# Patient Record
Sex: MALE | Race: BLACK OR AFRICAN AMERICAN | NOT HISPANIC OR LATINO | URBAN - METROPOLITAN AREA
[De-identification: names, ages, dates, MRNs, and addresses within clinical notes are randomized per-mention and may not be internally consistent; named-entity substitution may affect disease eponyms.]

---

## 2018-08-15 ENCOUNTER — INPATIENT (INPATIENT)
Facility: HOSPITAL | Age: 73
LOS: 5 days | Discharge: HOME | DRG: 190 | End: 2018-08-21
Attending: INTERNAL MEDICINE | Admitting: HOSPITALIST
Payer: MEDICARE

## 2018-08-15 VITALS
SYSTOLIC BLOOD PRESSURE: 134 MMHG | HEART RATE: 123 BPM | TEMPERATURE: 100 F | WEIGHT: 139.99 LBS | HEIGHT: 70 IN | RESPIRATION RATE: 20 BRPM | DIASTOLIC BLOOD PRESSURE: 88 MMHG | OXYGEN SATURATION: 98 %

## 2018-08-15 DIAGNOSIS — Z98.890 OTHER SPECIFIED POSTPROCEDURAL STATES: Chronic | ICD-10-CM

## 2018-08-15 DIAGNOSIS — J44.1 CHRONIC OBSTRUCTIVE PULMONARY DISEASE WITH (ACUTE) EXACERBATION: ICD-10-CM

## 2018-08-15 LAB
ALBUMIN SERPL ELPH-MCNC: 4.5 G/DL — SIGNIFICANT CHANGE UP (ref 3.3–5.2)
ALP SERPL-CCNC: 86 U/L — SIGNIFICANT CHANGE UP (ref 40–120)
ALT FLD-CCNC: 22 U/L — SIGNIFICANT CHANGE UP
ANION GAP SERPL CALC-SCNC: 15 MMOL/L — SIGNIFICANT CHANGE UP (ref 5–17)
APTT BLD: 29.8 SEC — SIGNIFICANT CHANGE UP (ref 27.5–37.4)
AST SERPL-CCNC: 30 U/L — SIGNIFICANT CHANGE UP
BASOPHILS # BLD AUTO: 0 K/UL — SIGNIFICANT CHANGE UP (ref 0–0.2)
BASOPHILS NFR BLD AUTO: 0.1 % — SIGNIFICANT CHANGE UP (ref 0–2)
BILIRUB SERPL-MCNC: 1.3 MG/DL — SIGNIFICANT CHANGE UP (ref 0.4–2)
BUN SERPL-MCNC: 8 MG/DL — SIGNIFICANT CHANGE UP (ref 8–20)
CALCIUM SERPL-MCNC: 9.7 MG/DL — SIGNIFICANT CHANGE UP (ref 8.6–10.2)
CHLORIDE SERPL-SCNC: 100 MMOL/L — SIGNIFICANT CHANGE UP (ref 98–107)
CO2 SERPL-SCNC: 27 MMOL/L — SIGNIFICANT CHANGE UP (ref 22–29)
CREAT SERPL-MCNC: 0.84 MG/DL — SIGNIFICANT CHANGE UP (ref 0.5–1.3)
EOSINOPHIL # BLD AUTO: 0.1 K/UL — SIGNIFICANT CHANGE UP (ref 0–0.5)
EOSINOPHIL NFR BLD AUTO: 0.6 % — SIGNIFICANT CHANGE UP (ref 0–5)
GLUCOSE SERPL-MCNC: 116 MG/DL — HIGH (ref 70–115)
HCT VFR BLD CALC: 48.7 % — SIGNIFICANT CHANGE UP (ref 42–52)
HGB BLD-MCNC: 16.4 G/DL — SIGNIFICANT CHANGE UP (ref 14–18)
INR BLD: 1.04 RATIO — SIGNIFICANT CHANGE UP (ref 0.88–1.16)
LYMPHOCYTES # BLD AUTO: 1.2 K/UL — SIGNIFICANT CHANGE UP (ref 1–4.8)
LYMPHOCYTES # BLD AUTO: 7 % — LOW (ref 20–55)
MCHC RBC-ENTMCNC: 31.4 PG — HIGH (ref 27–31)
MCHC RBC-ENTMCNC: 33.7 G/DL — SIGNIFICANT CHANGE UP (ref 32–36)
MCV RBC AUTO: 93.3 FL — SIGNIFICANT CHANGE UP (ref 80–94)
MONOCYTES # BLD AUTO: 1.3 K/UL — HIGH (ref 0–0.8)
MONOCYTES NFR BLD AUTO: 7.9 % — SIGNIFICANT CHANGE UP (ref 3–10)
NEUTROPHILS # BLD AUTO: 14.4 K/UL — HIGH (ref 1.8–8)
NEUTROPHILS NFR BLD AUTO: 84.1 % — HIGH (ref 37–73)
NT-PROBNP SERPL-SCNC: 98 PG/ML — SIGNIFICANT CHANGE UP (ref 0–300)
PLATELET # BLD AUTO: 202 K/UL — SIGNIFICANT CHANGE UP (ref 150–400)
POTASSIUM SERPL-MCNC: 4.3 MMOL/L — SIGNIFICANT CHANGE UP (ref 3.5–5.3)
POTASSIUM SERPL-SCNC: 4.3 MMOL/L — SIGNIFICANT CHANGE UP (ref 3.5–5.3)
PROT SERPL-MCNC: 7.5 G/DL — SIGNIFICANT CHANGE UP (ref 6.6–8.7)
PROTHROM AB SERPL-ACNC: 11.5 SEC — SIGNIFICANT CHANGE UP (ref 9.8–12.7)
RBC # BLD: 5.22 M/UL — SIGNIFICANT CHANGE UP (ref 4.6–6.2)
RBC # FLD: 15.2 % — SIGNIFICANT CHANGE UP (ref 11–15.6)
SODIUM SERPL-SCNC: 142 MMOL/L — SIGNIFICANT CHANGE UP (ref 135–145)
TROPONIN T SERPL-MCNC: <0.01 NG/ML — SIGNIFICANT CHANGE UP (ref 0–0.06)
WBC # BLD: 17.1 K/UL — HIGH (ref 4.8–10.8)
WBC # FLD AUTO: 17.1 K/UL — HIGH (ref 4.8–10.8)

## 2018-08-15 PROCEDURE — 99291 CRITICAL CARE FIRST HOUR: CPT

## 2018-08-15 PROCEDURE — 99223 1ST HOSP IP/OBS HIGH 75: CPT

## 2018-08-15 PROCEDURE — 71046 X-RAY EXAM CHEST 2 VIEWS: CPT | Mod: 26

## 2018-08-15 PROCEDURE — 71250 CT THORAX DX C-: CPT | Mod: 26

## 2018-08-15 PROCEDURE — 93306 TTE W/DOPPLER COMPLETE: CPT | Mod: 26

## 2018-08-15 PROCEDURE — 93010 ELECTROCARDIOGRAM REPORT: CPT

## 2018-08-15 RX ORDER — ACETAMINOPHEN 500 MG
650 TABLET ORAL ONCE
Qty: 0 | Refills: 0 | Status: DISCONTINUED | OUTPATIENT
Start: 2018-08-15 | End: 2018-08-15

## 2018-08-15 RX ORDER — ENOXAPARIN SODIUM 100 MG/ML
40 INJECTION SUBCUTANEOUS DAILY
Qty: 0 | Refills: 0 | Status: DISCONTINUED | OUTPATIENT
Start: 2018-08-15 | End: 2018-08-21

## 2018-08-15 RX ORDER — SODIUM CHLORIDE 9 MG/ML
3 INJECTION INTRAMUSCULAR; INTRAVENOUS; SUBCUTANEOUS ONCE
Qty: 0 | Refills: 0 | Status: COMPLETED | OUTPATIENT
Start: 2018-08-15 | End: 2018-08-15

## 2018-08-15 RX ORDER — SODIUM CHLORIDE 9 MG/ML
1900 INJECTION, SOLUTION INTRAVENOUS ONCE
Qty: 0 | Refills: 0 | Status: COMPLETED | OUTPATIENT
Start: 2018-08-15 | End: 2018-08-15

## 2018-08-15 RX ORDER — ACETAMINOPHEN 500 MG
650 TABLET ORAL EVERY 6 HOURS
Qty: 0 | Refills: 0 | Status: DISCONTINUED | OUTPATIENT
Start: 2018-08-15 | End: 2018-08-21

## 2018-08-15 RX ORDER — IPRATROPIUM/ALBUTEROL SULFATE 18-103MCG
3 AEROSOL WITH ADAPTER (GRAM) INHALATION
Qty: 0 | Refills: 0 | Status: DISCONTINUED | OUTPATIENT
Start: 2018-08-15 | End: 2018-08-21

## 2018-08-15 RX ORDER — AZITHROMYCIN 500 MG/1
250 TABLET, FILM COATED ORAL DAILY
Qty: 0 | Refills: 0 | Status: DISCONTINUED | OUTPATIENT
Start: 2018-08-16 | End: 2018-08-19

## 2018-08-15 RX ORDER — AZITHROMYCIN 500 MG/1
500 TABLET, FILM COATED ORAL ONCE
Qty: 0 | Refills: 0 | Status: COMPLETED | OUTPATIENT
Start: 2018-08-15 | End: 2018-08-15

## 2018-08-15 RX ORDER — CEFTRIAXONE 500 MG/1
1 INJECTION, POWDER, FOR SOLUTION INTRAMUSCULAR; INTRAVENOUS ONCE
Qty: 0 | Refills: 0 | Status: COMPLETED | OUTPATIENT
Start: 2018-08-15 | End: 2018-08-15

## 2018-08-15 RX ORDER — IPRATROPIUM/ALBUTEROL SULFATE 18-103MCG
3 AEROSOL WITH ADAPTER (GRAM) INHALATION EVERY 6 HOURS
Qty: 0 | Refills: 0 | Status: DISCONTINUED | OUTPATIENT
Start: 2018-08-15 | End: 2018-08-21

## 2018-08-15 RX ORDER — IPRATROPIUM/ALBUTEROL SULFATE 18-103MCG
3 AEROSOL WITH ADAPTER (GRAM) INHALATION ONCE
Qty: 0 | Refills: 0 | Status: COMPLETED | OUTPATIENT
Start: 2018-08-15 | End: 2018-08-15

## 2018-08-15 RX ORDER — HYDRALAZINE HCL 50 MG
10 TABLET ORAL EVERY 6 HOURS
Qty: 0 | Refills: 0 | Status: DISCONTINUED | OUTPATIENT
Start: 2018-08-15 | End: 2018-08-18

## 2018-08-15 RX ORDER — PANTOPRAZOLE SODIUM 20 MG/1
40 TABLET, DELAYED RELEASE ORAL
Qty: 0 | Refills: 0 | Status: DISCONTINUED | OUTPATIENT
Start: 2018-08-15 | End: 2018-08-21

## 2018-08-15 RX ORDER — SACCHAROMYCES BOULARDII 250 MG
250 POWDER IN PACKET (EA) ORAL
Qty: 0 | Refills: 0 | Status: DISCONTINUED | OUTPATIENT
Start: 2018-08-15 | End: 2018-08-20

## 2018-08-15 RX ADMIN — SODIUM CHLORIDE 3 MILLILITER(S): 9 INJECTION INTRAMUSCULAR; INTRAVENOUS; SUBCUTANEOUS at 16:06

## 2018-08-15 RX ADMIN — SODIUM CHLORIDE 1900 MILLILITER(S): 9 INJECTION, SOLUTION INTRAVENOUS at 12:00

## 2018-08-15 RX ADMIN — ENOXAPARIN SODIUM 40 MILLIGRAM(S): 100 INJECTION SUBCUTANEOUS at 22:09

## 2018-08-15 RX ADMIN — Medication 3 MILLILITER(S): at 20:37

## 2018-08-15 RX ADMIN — Medication 3 MILLILITER(S): at 15:26

## 2018-08-15 RX ADMIN — SODIUM CHLORIDE 1900 MILLILITER(S): 9 INJECTION, SOLUTION INTRAVENOUS at 10:41

## 2018-08-15 RX ADMIN — Medication 125 MILLIGRAM(S): at 10:49

## 2018-08-15 RX ADMIN — Medication 40 MILLIGRAM(S): at 22:10

## 2018-08-15 RX ADMIN — CEFTRIAXONE 100 GRAM(S): 500 INJECTION, POWDER, FOR SOLUTION INTRAMUSCULAR; INTRAVENOUS at 10:39

## 2018-08-15 RX ADMIN — AZITHROMYCIN 500 MILLIGRAM(S): 500 TABLET, FILM COATED ORAL at 10:40

## 2018-08-15 RX ADMIN — CEFTRIAXONE 1 GRAM(S): 500 INJECTION, POWDER, FOR SOLUTION INTRAMUSCULAR; INTRAVENOUS at 10:50

## 2018-08-15 RX ADMIN — Medication 3 MILLILITER(S): at 10:49

## 2018-08-15 NOTE — ED ADULT NURSE NOTE - OBJECTIVE STATEMENT
73 year old male comes to ED complaining of difficulty breathing. patient states his breathing worsened last night, unable to sleep. patient states it worsens when he is up walking around. no swelling noted to LE. patient complaining of productive cough with clear, dark phlegm denies blood. patient states he felt hot at home. 73 year old male comes to ED complaining of difficulty breathing. patient states his breathing worsened last night, unable to sleep. patient has hx. of COPD, no home 02. patient states it worsens when he is up walking around. no swelling noted to LE. patient complaining of productive cough with clear, dark phlegm denies blood. patient states he felt hot at home.

## 2018-08-15 NOTE — H&P ADULT - NSHPLABSRESULTS_GEN_ALL_CORE
LABS:                        16.4   17.1  )-----------( 202      ( 15 Aug 2018 10:25 )             48.7     08-15    142  |  100  |  8.0  ----------------------------<  116<H>  4.3   |  27.0  |  0.84    Ca    9.7      15 Aug 2018 10:25    TPro  7.5  /  Alb  4.5  /  TBili  1.3  /  DBili  x   /  AST  30  /  ALT  22  /  AlkPhos  86  08-15    PT/INR - ( 15 Aug 2018 10:25 )   PT: 11.5 sec;   INR: 1.04 ratio         PTT - ( 15 Aug 2018 10:25 )  PTT:29.8 sec    LIVER FUNCTIONS - ( 15 Aug 2018 10:25 )  Alb: 4.5 g/dL / Pro: 7.5 g/dL / ALK PHOS: 86 U/L / ALT: 22 U/L / AST: 30 U/L / GGT: x

## 2018-08-15 NOTE — ED PROVIDER NOTE - OBJECTIVE STATEMENT
Patient with PMH COPD presents complaining of SOB which started last night, worse with exertion prompting him to call 911. He states that his COPD worsened since he quit smoking in 2010. He denies any fevers, chills, sore throat, chest pain or abdominal pain. He notes that he frequently coughs up sputum which is either clear or darkish in color, but denies blood in the sputum or hemoptysis. He also denies nausea, vomiting, diarrhea or constipation. He takes no blood thinners. He denies orthopnea.    PMD: Dr. Brewer

## 2018-08-15 NOTE — H&P ADULT - HISTORY OF PRESENT ILLNESS
History obtained from Pt and her daughter Ya Edmonds over the phone. Pt is 74 y/o male with PMHx of COPD not home O2, HTH, ex-smoker, presented to ER with worsening SOB. Pt reports he is always short of breath for many years, over he last few months its more progressive, with reduced exercise tolerance due to SOB associated with productive cough with clear mucus. He denied any fever, chills, nausea, vomiting, sore throat, headache, dizziness, bowel /bladder habits are normal.

## 2018-08-15 NOTE — ED PROVIDER NOTE - NS ED ROS FT
Const: Denies fever, chills  HEENT: Denies blurry vision, sore throat  Neck: Denies neck pain/stiffness  Resp: + coughing, + SOB  Cardiovascular: Denies CP, palpitations, LE edema  GI: Denies nausea, vomiting, abdominal pain, diarrhea, constipation, blood in stool  : Denies urinary frequency/urgency/dysuria, hematuria  MSK: Denies back pain  Neuro: Denies HA, dizziness, numbness, weakness  Skin: Denies rashes.

## 2018-08-15 NOTE — ED ADULT NURSE NOTE - NSIMPLEMENTINTERV_GEN_ALL_ED
Implemented All Universal Safety Interventions:  [place] to call system. Call bell, personal items and telephone within reach. Instruct patient to call for assistance. Room bathroom lighting operational. Non-slip footwear when patient is off stretcher. Physically safe environment: no spills, clutter or unnecessary equipment. Stretcher in lowest position, wheels locked, appropriate side rails in place.

## 2018-08-15 NOTE — ED PROVIDER NOTE - PHYSICAL EXAMINATION
Const: Awake, alert and oriented. In no acute distress. Thin, but well appearing.  HEENT: NC/AT. Dry mucous membranes.  Eyes: No scleral icterus. EOMI.  Neck:. Soft and supple. Full ROM without pain.  Cardiac: Tachycardic rate and regular rhythm. +S1/S2. No murmurs. Peripheral pulses 2+ and symmetric. No LE edema.  Resp: Speaking in full sentences on 2L NC. No evidence of respiratory distress. Diminished breath sounds diffusely. No wheezes, rales or rhonchi.  Abd: Soft, non-tender, non-distended. Normal bowel sounds in all 4 quadrants. No guarding or rebound.  Back: Spine midline and non-tender. No CVAT.  Skin: No rashes, abrasions or lacerations.  Lymph: No cervical lymphadenopathy.  Neuro: Awake, alert & oriented x 3. Moves all extremities symmetrically.

## 2018-08-15 NOTE — ED PROVIDER NOTE - MEDICAL DECISION MAKING DETAILS
Patient with PMH COPD not on home O2 presents for SOB and coughing since last night requiring 2L NC at this time with SOB worse with exertion. Will continue 2L NC, check for infectious vs. cardiac etiology and treat for presumed COPD exacerbation and reassess.

## 2018-08-15 NOTE — H&P ADULT - NSHPPHYSICALEXAM_GEN_ALL_CORE
PHYSICAL EXAM:    Vital Signs Last 24 Hrs  T(C): 37.7 (15 Aug 2018 10:01), Max: 37.7 (15 Aug 2018 10:01)  T(F): 99.8 (15 Aug 2018 10:01), Max: 99.8 (15 Aug 2018 10:01)  HR: 123 (15 Aug 2018 10:01) (123 - 123)  BP: 134/88 (15 Aug 2018 10:01) (134/88 - 134/88)  BP(mean): --  RR: 20 (15 Aug 2018 10:01) (20 - 20)  SpO2: 98% (15 Aug 2018 10:01) (98% - 98%)    GENERAL: Pt lying comfortably, NAD.  ENMT: PERRL, +EOMI.  NECK: soft, Supple, No JVD,   CHEST/LUNG: Diminished breath sound b/l, no wheezing or crackles.   HEART: S1S2+, Regular rate and rhythm; No murmurs.  ABDOMEN: Soft, Nontender, Nondistended; Bowel sounds present.  MUSCULOSKELETAL: Normal range of motion.  SKIN: No rashes or lesions.  NEURO: AAOX3, no focal deficits, no motor r sensory loss.  PSYCH: normal mood.

## 2018-08-16 LAB
CHOLEST SERPL-MCNC: 167 MG/DL — SIGNIFICANT CHANGE UP (ref 110–199)
HBA1C BLD-MCNC: 5.2 % — SIGNIFICANT CHANGE UP (ref 4–5.6)
HCT VFR BLD CALC: 43.4 % — SIGNIFICANT CHANGE UP (ref 42–52)
HDLC SERPL-MCNC: 85 MG/DL — SIGNIFICANT CHANGE UP
HGB BLD-MCNC: 14.4 G/DL — SIGNIFICANT CHANGE UP (ref 14–18)
LIPID PNL WITH DIRECT LDL SERPL: 68 MG/DL — SIGNIFICANT CHANGE UP
MCHC RBC-ENTMCNC: 31 PG — SIGNIFICANT CHANGE UP (ref 27–31)
MCHC RBC-ENTMCNC: 33.2 G/DL — SIGNIFICANT CHANGE UP (ref 32–36)
MCV RBC AUTO: 93.3 FL — SIGNIFICANT CHANGE UP (ref 80–94)
PLATELET # BLD AUTO: 185 K/UL — SIGNIFICANT CHANGE UP (ref 150–400)
RBC # BLD: 4.65 M/UL — SIGNIFICANT CHANGE UP (ref 4.6–6.2)
RBC # FLD: 14.9 % — SIGNIFICANT CHANGE UP (ref 11–15.6)
TOTAL CHOLESTEROL/HDL RATIO MEASUREMENT: 2 RATIO — LOW (ref 3.4–9.6)
TRIGL SERPL-MCNC: 70 MG/DL — SIGNIFICANT CHANGE UP (ref 10–200)
WBC # BLD: 11.7 K/UL — HIGH (ref 4.8–10.8)
WBC # FLD AUTO: 11.7 K/UL — HIGH (ref 4.8–10.8)

## 2018-08-16 PROCEDURE — 99222 1ST HOSP IP/OBS MODERATE 55: CPT

## 2018-08-16 PROCEDURE — 99233 SBSQ HOSP IP/OBS HIGH 50: CPT

## 2018-08-16 RX ADMIN — Medication 40 MILLIGRAM(S): at 21:44

## 2018-08-16 RX ADMIN — Medication 40 MILLIGRAM(S): at 13:44

## 2018-08-16 RX ADMIN — Medication 40 MILLIGRAM(S): at 05:47

## 2018-08-16 RX ADMIN — Medication 250 MILLIGRAM(S): at 05:47

## 2018-08-16 RX ADMIN — Medication 3 MILLILITER(S): at 14:16

## 2018-08-16 RX ADMIN — Medication 3 MILLILITER(S): at 05:05

## 2018-08-16 RX ADMIN — Medication 250 MILLIGRAM(S): at 18:17

## 2018-08-16 RX ADMIN — Medication 3 MILLILITER(S): at 20:24

## 2018-08-16 RX ADMIN — Medication 3 MILLILITER(S): at 09:43

## 2018-08-16 RX ADMIN — ENOXAPARIN SODIUM 40 MILLIGRAM(S): 100 INJECTION SUBCUTANEOUS at 13:44

## 2018-08-16 RX ADMIN — AZITHROMYCIN 250 MILLIGRAM(S): 500 TABLET, FILM COATED ORAL at 13:44

## 2018-08-16 RX ADMIN — PANTOPRAZOLE SODIUM 40 MILLIGRAM(S): 20 TABLET, DELAYED RELEASE ORAL at 05:47

## 2018-08-16 NOTE — PROGRESS NOTE ADULT - SUBJECTIVE AND OBJECTIVE BOX
seen for shortness of breath    no acute complaints  feels better,  improving sob/cough  no chest pain  ros otherwise negative     MEDICATIONS  (STANDING):  ALBUTerol/ipratropium for Nebulization 3 milliLiter(s) Nebulizer every 6 hours  azithromycin   Tablet 250 milliGRAM(s) Oral daily  enoxaparin Injectable 40 milliGRAM(s) SubCutaneous daily  methylPREDNISolone sodium succinate Injectable 40 milliGRAM(s) IV Push every 8 hours  pantoprazole    Tablet 40 milliGRAM(s) Oral before breakfast  saccharomyces boulardii 250 milliGRAM(s) Oral two times a day    MEDICATIONS  (PRN):  acetaminophen   Tablet 650 milliGRAM(s) Oral every 6 hours PRN For Temp greater than 38 C (100.4 F)  acetaminophen   Tablet. 650 milliGRAM(s) Oral every 6 hours PRN Mild Pain (1 - 3)  ALBUTerol/ipratropium for Nebulization 3 milliLiter(s) Nebulizer every 3 hours PRN Shortness of Breath  hydrALAZINE Injectable 10 milliGRAM(s) IV Push every 6 hours PRN SBP>170      Allergies    No Known Allergies      Vital Signs Last 24 Hrs  T(C): 36.6 (16 Aug 2018 07:54), Max: 36.9 (15 Aug 2018 20:24)  T(F): 97.8 (16 Aug 2018 07:54), Max: 98.4 (15 Aug 2018 20:24)  HR: 70 (16 Aug 2018 09:45) (70 - 90)  BP: 122/76 (16 Aug 2018 07:54) (120/76 - 166/93)  BP(mean): --  RR: 18 (16 Aug 2018 07:54) (18 - 20)  SpO2: 97% (16 Aug 2018 09:45) (96% - 100%)    PHYSICAL EXAM:    GENERAL: NAD frail/cachectic  CHEST/LUNG: diminished breath sounds diffusely  HEART: Regular rate and rhythm; S1 S2  ABDOMEN: Soft, Nontender, Nondistended; Bowel sounds present  EXTREMITIES:  no edema  NERVOUS SYSTEM:  Alert & Oriented X3, nonfocal    LABS:                        14.4   11.7  )-----------( 185      ( 16 Aug 2018 06:59 )             43.4     08-15    142  |  100  |  8.0  ----------------------------<  116<H>  4.3   |  27.0  |  0.84    Ca    9.7      15 Aug 2018 10:25    TPro  7.5  /  Alb  4.5  /  TBili  1.3  /  DBili  x   /  AST  30  /  ALT  22  /  AlkPhos  86  08-15    PT/INR - ( 15 Aug 2018 10:25 )   PT: 11.5 sec;   INR: 1.04 ratio         PTT - ( 15 Aug 2018 10:25 )  PTT:29.8 sec      CAPILLARY BLOOD GLUCOSE            RADIOLOGY & ADDITIONAL TESTS:

## 2018-08-16 NOTE — CONSULT NOTE ADULT - ASSESSMENT
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

## 2018-08-16 NOTE — PHYSICAL THERAPY INITIAL EVALUATION ADULT - ADDITIONAL COMMENTS
pt lives in a private home with his spouse with 4 steps to enter with no rails, no stairs inside. Pt has no DME.

## 2018-08-16 NOTE — CONSULT NOTE ADULT - SUBJECTIVE AND OBJECTIVE BOX
PULMONARY CONSULT NOTE      GUERLINE ANDRADEMARIFER-[id number]    Patient is a 73y old  Male who presents with a chief complaint of SOB (15 Aug 2018 14:26)      HISTORY OF PRESENT ILLNESS:    MEDICATIONS  (STANDING):  ALBUTerol/ipratropium for Nebulization 3 milliLiter(s) Nebulizer every 6 hours  azithromycin   Tablet 250 milliGRAM(s) Oral daily  enoxaparin Injectable 40 milliGRAM(s) SubCutaneous daily  methylPREDNISolone sodium succinate Injectable 40 milliGRAM(s) IV Push every 8 hours  pantoprazole    Tablet 40 milliGRAM(s) Oral before breakfast  saccharomyces boulardii 250 milliGRAM(s) Oral two times a day      MEDICATIONS  (PRN):  acetaminophen   Tablet 650 milliGRAM(s) Oral every 6 hours PRN For Temp greater than 38 C (100.4 F)  acetaminophen   Tablet. 650 milliGRAM(s) Oral every 6 hours PRN Mild Pain (1 - 3)  ALBUTerol/ipratropium for Nebulization 3 milliLiter(s) Nebulizer every 3 hours PRN Shortness of Breath  hydrALAZINE Injectable 10 milliGRAM(s) IV Push every 6 hours PRN SBP>170      Allergies    No Known Allergies    Intolerances        PAST MEDICAL & SURGICAL HISTORY:  Essential hypertension  Chronic obstructive pulmonary disease, unspecified COPD type  H/O hernia repair      FAMILY HISTORY:  No pertinent family history in first degree relatives      SOCIAL HISTORY  Smoking History:     REVIEW OF SYSTEMS:    CONSTITUTIONAL:  No fevers, chills, sweats    HEENT:  Eyes:  No diplopia or blurred vision. ENT:  No earache, sore throat or runny nose. sinus headache or postnasl drip    CARDIOVASCULAR:  No pressure, squeezing, tightness, or heaviness about the chest; no palpitations, leg swelling, orthopnea or PND    RESPIRATORY:  No cough, shortness of breath, PND or orthopnea. Mild SOBOE    GASTROINTESTINAL:  No abdominal pain, nausea, vomiting or diarrhea.    GENITOURINARY:  No dysuria, frequency or urgency.    NEUROLOGIC:  No paresthesias, fasciculations, seizures or weakness.    PSYCHIATRIC:  No disorder of thought or mood.    Vital Signs Last 24 Hrs  T(C): 36.6 (16 Aug 2018 07:54), Max: 36.9 (15 Aug 2018 20:24)  T(F): 97.8 (16 Aug 2018 07:54), Max: 98.4 (15 Aug 2018 20:24)  HR: 70 (16 Aug 2018 09:45) (70 - 90)  BP: 122/76 (16 Aug 2018 07:54) (120/76 - 166/93)  BP(mean): --  RR: 18 (16 Aug 2018 07:54) (18 - 20)  SpO2: 97% (16 Aug 2018 09:45) (96% - 100%)    PHYSICAL EXAMINATION:    GENERAL: The patient is a well-developed, well-nourished _____in no apparent distress.     HEENT: Head is normocephalic and atraumatic. Extraocular muscles are intact. Mucous membranes are moist.     NECK: Supple.     LUNGS: Clear to auscultation without wheezing, rales, or rhonchi. Respirations unlabored    HEART: Regular rate and rhythm without murmur.    ABDOMEN: Soft, nontender, and nondistended.  No hepatosplenomegaly is noted.    EXTREMITIES: Without any cyanosis, clubbing, rash, lesions or edema.    NEUROLOGIC: Grossly intact.      LABS:                        14.4   11.7  )-----------( 185      ( 16 Aug 2018 06:59 )             43.4     08-15    142  |  100  |  8.0  ----------------------------<  116<H>  4.3   |  27.0  |  0.84    Ca    9.7      15 Aug 2018 10:25    TPro  7.5  /  Alb  4.5  /  TBili  1.3  /  DBili  x   /  AST  30  /  ALT  22  /  AlkPhos  86  08-15    PT/INR - ( 15 Aug 2018 10:25 )   PT: 11.5 sec;   INR: 1.04 ratio         PTT - ( 15 Aug 2018 10:25 )  PTT:29.8 sec      CARDIAC MARKERS ( 15 Aug 2018 15:42 )  x     / <0.01 ng/mL / x     / x     / x            Serum Pro-Brain Natriuretic Peptide: 98 pg/mL (08-15-18 @ 13:15)          MICROBIOLOGY:    RADIOLOGY & ADDITIONAL STUDIES:  < from: CT Chest No Cont (08.15.18 @ 15:24) >     EXAM:  CT CHEST                          PROCEDURE DATE:  08/15/2018          INTERPRETATION:  HISTORY: Shortness of breath.    Date and Time of Exam: 8/15/2018 2:59 PM    TECHNIQUE:  Sections were obtained from the apices to the diaphragm   without intravenous contrast.    COMPARISON EXAMINATION:   No prior exam.    FINDINGS: No evidence of mediastinal or hilar lymphadenopathy. No   evidence of cardiomegaly. No evidence of pleural or pericardial effusion.    Coronary artery calcifications arenoted.    Bilateral emphysematous changes are noted. There is a strand of   atelectasis or fibrosis at the right lung base. The right apex there is a   small nodular density measuring less than 1 cm. The appearance suggests a   region of fibrosis or inflammation although neoplasm cannot be excluded.   The left lung is clear.    No significant osseous abnormality.      IMPRESSION:     Emphysematous changes.    Inflammatory or fibrotic focus in the right apex versus neoplastic   nodule. A follow-upCT scan of the chest is recommended in 3 months for   further evaluation of this finding..                 CHAUNCEY ELIAS M.D., ATTENDING RADIOLOGIST  This document has been electronically signed. Aug 15 2018  3:53PM    < end of copied text >  All films reviewed on PACS PULMONARY CONSULT NOTE      GUERLINE ANDRADEMARIFER-[id number]    Patient is a 73y old  Male who presents with a chief complaint of SOB (15 Aug 2018 14:26)      HISTORY OF PRESENT ILLNESS:  72 yo male with Hx COPD, >40pkyrs SC'ed 10 yrs ago,seen for increased painless SOB with cough and wheeze unresponsive to home Advair.  Marked reduction exercise tolerance. No edema, pnd or orthopnea.  .  MEDICATIONS  (STANDING):  ALBUTerol/ipratropium for Nebulization 3 milliLiter(s) Nebulizer every 6 hours  azithromycin   Tablet 250 milliGRAM(s) Oral daily  enoxaparin Injectable 40 milliGRAM(s) SubCutaneous daily  methylPREDNISolone sodium succinate Injectable 40 milliGRAM(s) IV Push every 8 hours  pantoprazole    Tablet 40 milliGRAM(s) Oral before breakfast  saccharomyces boulardii 250 milliGRAM(s) Oral two times a day      MEDICATIONS  (PRN):  acetaminophen   Tablet 650 milliGRAM(s) Oral every 6 hours PRN For Temp greater than 38 C (100.4 F)  acetaminophen   Tablet. 650 milliGRAM(s) Oral every 6 hours PRN Mild Pain (1 - 3)  ALBUTerol/ipratropium for Nebulization 3 milliLiter(s) Nebulizer every 3 hours PRN Shortness of Breath  hydrALAZINE Injectable 10 milliGRAM(s) IV Push every 6 hours PRN SBP>170      Allergies    No Known Allergies    Intolerances        PAST MEDICAL & SURGICAL HISTORY:  Essential hypertension  Chronic obstructive pulmonary disease, unspecified COPD type  H/O hernia repair      FAMILY HISTORY:  No pertinent family history in first degree relatives      SOCIAL HISTORY  Smoking History:     REVIEW OF SYSTEMS:    CONSTITUTIONAL:  No fevers, chills, sweats    HEENT:  Eyes:  No diplopia or blurred vision. ENT:  No earache, sore throat or runny nose. sinus headache or postnasl drip    CARDIOVASCULAR:  No pressure, squeezing, tightness, or heaviness about the chest; no palpitations, leg swelling, orthopnea or PND    RESPIRATORY:  above    GASTROINTESTINAL:  No abdominal pain, nausea, vomiting or diarrhea.    GENITOURINARY:  No dysuria, frequency or urgency.    NEUROLOGIC:  No paresthesias, fasciculations, seizures or weakness.    PSYCHIATRIC:  No disorder of thought or mood.    Vital Signs Last 24 Hrs  T(C): 36.6 (16 Aug 2018 07:54), Max: 36.9 (15 Aug 2018 20:24)  T(F): 97.8 (16 Aug 2018 07:54), Max: 98.4 (15 Aug 2018 20:24)  HR: 70 (16 Aug 2018 09:45) (70 - 90)  BP: 122/76 (16 Aug 2018 07:54) (120/76 - 166/93)  BP(mean): --  RR: 18 (16 Aug 2018 07:54) (18 - 20)  SpO2: 97% (16 Aug 2018 09:45) (96% - 100%)    PHYSICAL EXAMINATION:    GENERAL: The patient is a well-developed, well-nourished male in no apparent distress.     HEENT: Head is normocephalic and atraumatic. Extraocular muscles are intact. Mucous membranes are moist.     NECK: Supple.     LUNGS: marked diminished familia BS with inc I/E ratio, no rales, or rhonchi. Respirations unlabored    HEART: Regular rate and rhythm without murmur.    ABDOMEN: Soft, nontender, and nondistended.  No hepatosplenomegaly is noted.    EXTREMITIES: Without any cyanosis, clubbing, rash, lesions or edema.    NEUROLOGIC: Grossly intact.      LABS:                        14.4   11.7  )-----------( 185      ( 16 Aug 2018 06:59 )             43.4     08-15    142  |  100  |  8.0  ----------------------------<  116<H>  4.3   |  27.0  |  0.84    Ca    9.7      15 Aug 2018 10:25    TPro  7.5  /  Alb  4.5  /  TBili  1.3  /  DBili  x   /  AST  30  /  ALT  22  /  AlkPhos  86  08-15    PT/INR - ( 15 Aug 2018 10:25 )   PT: 11.5 sec;   INR: 1.04 ratio         PTT - ( 15 Aug 2018 10:25 )  PTT:29.8 sec      CARDIAC MARKERS ( 15 Aug 2018 15:42 )  x     / <0.01 ng/mL / x     / x     / x            Serum Pro-Brain Natriuretic Peptide: 98 pg/mL (08-15-18 @ 13:15)          MICROBIOLOGY:    RADIOLOGY & ADDITIONAL STUDIES:  < from: CT Chest No Cont (08.15.18 @ 15:24) >     EXAM:  CT CHEST                          PROCEDURE DATE:  08/15/2018          INTERPRETATION:  HISTORY: Shortness of breath.    Date and Time of Exam: 8/15/2018 2:59 PM    TECHNIQUE:  Sections were obtained from the apices to the diaphragm   without intravenous contrast.    COMPARISON EXAMINATION:   No prior exam.    FINDINGS: No evidence of mediastinal or hilar lymphadenopathy. No   evidence of cardiomegaly. No evidence of pleural or pericardial effusion.    Coronary artery calcifications arenoted.    Bilateral emphysematous changes are noted. There is a strand of   atelectasis or fibrosis at the right lung base. The right apex there is a   small nodular density measuring less than 1 cm. The appearance suggests a   region of fibrosis or inflammation although neoplasm cannot be excluded.   The left lung is clear.    No significant osseous abnormality.      IMPRESSION:     Emphysematous changes.    Inflammatory or fibrotic focus in the right apex versus neoplastic   nodule. A follow-upCT scan of the chest is recommended in 3 months for   further evaluation of this finding..                 CHAUNCEY ELIAS M.D., ATTENDING RADIOLOGIST  This document has been electronically signed. Aug 15 2018  3:53PM    < end of copied text >  All films reviewed on PACS

## 2018-08-16 NOTE — PHYSICAL THERAPY INITIAL EVALUATION ADULT - PERTINENT HX OF CURRENT PROBLEM, REHAB EVAL
pt is a 74 y/o male who presented to ER with worsening SOB, cough and limited activity due to SOB, pt with PMHx of COPD

## 2018-08-16 NOTE — PROGRESS NOTE ADULT - ASSESSMENT
P73 y/o male with PMHx of COPD not home O2, HTN, ex-smoker, presented to ER with worsening SOB, cough and limited activity due to SOB, In ER noted to have high WBC, CXR clean. Admitted to medicine for worsening SOB likely from COPD.     COPD exacerbation:  - Requiring O2 in ER.  - Will keep on IV steroids, Duoneb, Zithromax.   - Supplements O2, PPI while on steroids.  - off note- Pt does not follow any pulmonology, only on Advair at home.  - pulmonary consulted to ensure outpatient follow up    Leukocytosis:  - no sign of infection, afebrile, lactate normal, CXR with no infection.   - Zithromax for COPD as above.  - Monitor WBCs.    >H/o HTN- BP stable here. On Lotrel 10/20 at home, per pharmacy last picked up in Feb/18, ? non compliance. Keep on hydralazine prn for now, If BP elevated consider resuming home meds.   >DVT ppx- Lovenox.

## 2018-08-17 DIAGNOSIS — Z87.891 PERSONAL HISTORY OF NICOTINE DEPENDENCE: ICD-10-CM

## 2018-08-17 DIAGNOSIS — Z86.79 PERSONAL HISTORY OF OTHER DISEASES OF THE CIRCULATORY SYSTEM: ICD-10-CM

## 2018-08-17 PROBLEM — I10 ESSENTIAL (PRIMARY) HYPERTENSION: Chronic | Status: ACTIVE | Noted: 2018-08-15

## 2018-08-17 PROBLEM — J44.9 CHRONIC OBSTRUCTIVE PULMONARY DISEASE, UNSPECIFIED: Chronic | Status: ACTIVE | Noted: 2018-08-15

## 2018-08-17 PROCEDURE — 99233 SBSQ HOSP IP/OBS HIGH 50: CPT

## 2018-08-17 PROCEDURE — 99232 SBSQ HOSP IP/OBS MODERATE 35: CPT

## 2018-08-17 RX ORDER — AMLODIPINE BESYLATE AND BENAZEPRIL HYDROCHLORIDE 10; 20 MG/1; MG/1
1 CAPSULE ORAL
Qty: 0 | Refills: 0 | COMMUNITY

## 2018-08-17 RX ORDER — METOPROLOL TARTRATE 50 MG
25 TABLET ORAL
Qty: 0 | Refills: 0 | Status: DISCONTINUED | OUTPATIENT
Start: 2018-08-17 | End: 2018-08-21

## 2018-08-17 RX ORDER — SODIUM CHLORIDE 9 MG/ML
1000 INJECTION INTRAMUSCULAR; INTRAVENOUS; SUBCUTANEOUS
Qty: 0 | Refills: 0 | Status: COMPLETED | OUTPATIENT
Start: 2018-08-17 | End: 2018-08-18

## 2018-08-17 RX ADMIN — Medication 250 MILLIGRAM(S): at 18:03

## 2018-08-17 RX ADMIN — Medication 250 MILLIGRAM(S): at 05:54

## 2018-08-17 RX ADMIN — Medication 40 MILLIGRAM(S): at 05:54

## 2018-08-17 RX ADMIN — Medication 3 MILLILITER(S): at 11:13

## 2018-08-17 RX ADMIN — AZITHROMYCIN 250 MILLIGRAM(S): 500 TABLET, FILM COATED ORAL at 11:24

## 2018-08-17 RX ADMIN — PANTOPRAZOLE SODIUM 40 MILLIGRAM(S): 20 TABLET, DELAYED RELEASE ORAL at 05:54

## 2018-08-17 RX ADMIN — Medication 40 MILLIGRAM(S): at 18:03

## 2018-08-17 RX ADMIN — Medication 3 MILLILITER(S): at 15:31

## 2018-08-17 RX ADMIN — Medication 25 MILLIGRAM(S): at 18:02

## 2018-08-17 RX ADMIN — ENOXAPARIN SODIUM 40 MILLIGRAM(S): 100 INJECTION SUBCUTANEOUS at 11:24

## 2018-08-17 RX ADMIN — Medication 3 MILLILITER(S): at 03:37

## 2018-08-17 RX ADMIN — SODIUM CHLORIDE 100 MILLILITER(S): 9 INJECTION INTRAMUSCULAR; INTRAVENOUS; SUBCUTANEOUS at 20:30

## 2018-08-17 RX ADMIN — Medication 3 MILLILITER(S): at 20:39

## 2018-08-17 NOTE — PROGRESS NOTE ADULT - SUBJECTIVE AND OBJECTIVE BOX
seen for COPD     improved sob/cough  no cp  overall improved  ros otherwise negative     MEDICATIONS  (STANDING):  ALBUTerol/ipratropium for Nebulization 3 milliLiter(s) Nebulizer every 6 hours  azithromycin   Tablet 250 milliGRAM(s) Oral daily  enoxaparin Injectable 40 milliGRAM(s) SubCutaneous daily  methylPREDNISolone sodium succinate Injectable 40 milliGRAM(s) IV Push two times a day  pantoprazole    Tablet 40 milliGRAM(s) Oral before breakfast  saccharomyces boulardii 250 milliGRAM(s) Oral two times a day    MEDICATIONS  (PRN):  acetaminophen   Tablet 650 milliGRAM(s) Oral every 6 hours PRN For Temp greater than 38 C (100.4 F)  acetaminophen   Tablet. 650 milliGRAM(s) Oral every 6 hours PRN Mild Pain (1 - 3)  ALBUTerol/ipratropium for Nebulization 3 milliLiter(s) Nebulizer every 3 hours PRN Shortness of Breath  hydrALAZINE Injectable 10 milliGRAM(s) IV Push every 6 hours PRN SBP>170      Allergies    No Known Allergies      Vital Signs Last 24 Hrs  T(C): 36.7 (17 Aug 2018 07:47), Max: 36.7 (16 Aug 2018 18:45)  T(F): 98.1 (17 Aug 2018 07:47), Max: 98.1 (17 Aug 2018 07:47)  HR: 68 (17 Aug 2018 07:47) (65 - 88)  BP: 138/76 (17 Aug 2018 07:47) (114/80 - 159/90)  BP(mean): --  RR: 18 (17 Aug 2018 08:33) (18 - 20)  SpO2: 95% (17 Aug 2018 08:33) (95% - 100%)    PHYSICAL EXAM:    GENERAL: NAD cachectic  CHEST/LUNG: ctab  HEART: Regular rate and rhythm; S1 S2; no murmurs noted  ABDOMEN: Soft, Nontender, Nondistended; Bowel sounds present  EXTREMITIES:  no edema      LABS:                        14.4   11.7  )-----------( 185      ( 16 Aug 2018 06:59 )             43.4     08-15    142  |  100  |  8.0  ----------------------------<  116<H>  4.3   |  27.0  |  0.84    Ca    9.7      15 Aug 2018 10:25    TPro  7.5  /  Alb  4.5  /  TBili  1.3  /  DBili  x   /  AST  30  /  ALT  22  /  AlkPhos  86  08-15    PT/INR - ( 15 Aug 2018 10:25 )   PT: 11.5 sec;   INR: 1.04 ratio         PTT - ( 15 Aug 2018 10:25 )  PTT:29.8 sec      CAPILLARY BLOOD GLUCOSE            RADIOLOGY & ADDITIONAL TESTS:

## 2018-08-17 NOTE — PROGRESS NOTE ADULT - SUBJECTIVE AND OBJECTIVE BOX
PULMONARY PROGRESS NOTE      [facility][id number]    Patient is a 73y old  Male who presents with a chief complaint of SOB (15 Aug 2018 14:26)      INTERVAL HPI/OVERNIGHT EVENTS: He says he is feeling better. Less SOB. Desats with exertion.     MEDICATIONS  (STANDING):  ALBUTerol/ipratropium for Nebulization 3 milliLiter(s) Nebulizer every 6 hours  azithromycin   Tablet 250 milliGRAM(s) Oral daily  enoxaparin Injectable 40 milliGRAM(s) SubCutaneous daily  methylPREDNISolone sodium succinate Injectable 40 milliGRAM(s) IV Push two times a day  pantoprazole    Tablet 40 milliGRAM(s) Oral before breakfast  saccharomyces boulardii 250 milliGRAM(s) Oral two times a day      MEDICATIONS  (PRN):  acetaminophen   Tablet 650 milliGRAM(s) Oral every 6 hours PRN For Temp greater than 38 C (100.4 F)  acetaminophen   Tablet. 650 milliGRAM(s) Oral every 6 hours PRN Mild Pain (1 - 3)  ALBUTerol/ipratropium for Nebulization 3 milliLiter(s) Nebulizer every 3 hours PRN Shortness of Breath  hydrALAZINE Injectable 10 milliGRAM(s) IV Push every 6 hours PRN SBP>170      Allergies    No Known Allergies    Intolerances        PAST MEDICAL & SURGICAL HISTORY:  Essential hypertension  Chronic obstructive pulmonary disease, unspecified COPD type  H/O hernia repair      SOCIAL HISTORY  Smoking History:       REVIEW OF SYSTEMS:    CONSTITUTIONAL:  No distress    HEENT:  Eyes:  No diplopia or blurred vision. ENT:  No earache, sore throat or runny nose.    CARDIOVASCULAR:  No pressure, squeezing, tightness, heaviness or aching about the chest; no palpitations.    RESPIRATORY:  per HPI     GASTROINTESTINAL:  No nausea, vomiting or diarrhea.    GENITOURINARY:  No dysuria, frequency or urgency.    NEUROLOGIC:  No paresthesias, fasciculations, seizures or weakness.    PSYCHIATRIC:  No disorder of thought or mood.    Vital Signs Last 24 Hrs  T(C): 36.7 (17 Aug 2018 07:47), Max: 36.7 (16 Aug 2018 18:45)  T(F): 98.1 (17 Aug 2018 07:47), Max: 98.1 (17 Aug 2018 07:47)  HR: 68 (17 Aug 2018 07:47) (65 - 88)  BP: 138/76 (17 Aug 2018 07:47) (114/80 - 159/90)  BP(mean): --  RR: 18 (17 Aug 2018 08:33) (18 - 20)  SpO2: 95% (17 Aug 2018 08:33) (95% - 100%)    PHYSICAL EXAMINATION:    GENERAL: The patient is awake and alert in no apparent distress.     HEENT: Head is normocephalic and atraumatic. Extraocular muscles are intact. Mucous membranes are moist.    NECK: Supple.    LUNGS: decreaed apices, no wheeze,  respirations unlabored    HEART: Regular rate and rhythm w r.    ABDOMEN: Soft, nontender, and nondistended.      EXTREMITIES: Without any cyanosis, clubbing, rash, lesions or edema.    NEUROLOGIC: Grossly intact.    LABS:                        14.4   11.7  )-----------( 185      ( 16 Aug 2018 06:59 )             43.4          CARDIAC MARKERS ( 15 Aug 2018 15:42 )  x     / <0.01 ng/mL / x     / x     / x            Serum Pro-Brain Natriuretic Peptide: 98 pg/mL (08-15-18 @ 13:15)         RADIOLOGY & ADDITIONAL STUDIES:  < from: CT Chest No Cont (08.15.18 @ 15:24) >     EXAM:  CT CHEST                          PROCEDURE DATE:  08/15/2018          INTERPRETATION:  HISTORY: Shortness of breath.    Date and Time of Exam: 8/15/2018 2:59 PM    TECHNIQUE:  Sections were obtained from the apices to the diaphragm   without intravenous contrast.    COMPARISON EXAMINATION:   No prior exam.    FINDINGS: No evidence of mediastinal or hilar lymphadenopathy. No   evidence of cardiomegaly. No evidence of pleural or pericardial effusion.    Coronary artery calcifications arenoted.    Bilateral emphysematous changes are noted. There is a strand of   atelectasis or fibrosis at the right lung base. The right apex there is a   small nodular density measuring less than 1 cm. The appearance suggests a   region of fibrosis or inflammation although neoplasm cannot be excluded.   The left lung is clear.    No significant osseous abnormality.      IMPRESSION:     Emphysematous changes.    Inflammatory or fibrotic focus in the right apex versus neoplastic   nodule. A follow-upCT scan of the chest is recommended in 3 months for   further evaluation of this finding..                 CHAUNCEY ELIAS M.D., ATTENDING RADIOLOGIST    < end of copied text >

## 2018-08-17 NOTE — PROGRESS NOTE ADULT - ASSESSMENT
P73 y/o male with PMHx of COPD not home O2, HTN, ex-smoker, presented to ER with worsening SOB, cough and limited activity due to SOB, In ER noted to have high WBC, CXR clean. Admitted to medicine for worsening SOB likely from COPD.     COPD exacerbation:  - requiring home o2  o2 sat at [facility] on ambulation 82%  - decrease steroids, c/w nebs  - pulmonary consulted to ensure outpatient follow up    Leukocytosis: resolving  - no sign of infection, afebrile, lactate normal, CXR with no infection.   - Zithromax for COPD as above.    >H/o HTN- BP stable here. On Lotrel 10/20 at home, per pharmacy last picked up in Feb/18, ? non compliance. Keep on hydralazine prn for now, If BP elevated consider resuming home meds.   >DVT ppx- Lovenox.    dc in 24-48 hrs

## 2018-08-17 NOTE — PROGRESS NOTE ADULT - ASSESSMENT
-AECOPD  -Severe emphysema  -hypoxic resp failure  -RUL nodule; scar vs neoplasm    RECC:  Steroid with taper. Nebs. LABA/LAMA as outpt. O2 need to be arranged as outpt. -AECOPD  -Severe emphysema  -hypoxic resp failure  -RUL nodule; scar vs neoplasm    RECC:  Steroid with taper. Nebs. LABA/LAMA as outpt. O2 need to be arranged for home prior to d/c. Will need outpt CT chest f/u in 3-6 months.

## 2018-08-18 LAB
ANION GAP SERPL CALC-SCNC: 12 MMOL/L — SIGNIFICANT CHANGE UP (ref 5–17)
BUN SERPL-MCNC: 23 MG/DL — HIGH (ref 8–20)
CALCIUM SERPL-MCNC: 9.1 MG/DL — SIGNIFICANT CHANGE UP (ref 8.6–10.2)
CHLORIDE SERPL-SCNC: 104 MMOL/L — SIGNIFICANT CHANGE UP (ref 98–107)
CO2 SERPL-SCNC: 26 MMOL/L — SIGNIFICANT CHANGE UP (ref 22–29)
CREAT SERPL-MCNC: 0.72 MG/DL — SIGNIFICANT CHANGE UP (ref 0.5–1.3)
D DIMER BLD IA.RAPID-MCNC: 310 NG/ML DDU — HIGH
GLUCOSE SERPL-MCNC: 106 MG/DL — SIGNIFICANT CHANGE UP (ref 70–115)
MAGNESIUM SERPL-MCNC: 2.3 MG/DL — SIGNIFICANT CHANGE UP (ref 1.6–2.6)
PHOSPHATE SERPL-MCNC: 2.9 MG/DL — SIGNIFICANT CHANGE UP (ref 2.4–4.7)
POTASSIUM SERPL-MCNC: 4.9 MMOL/L — SIGNIFICANT CHANGE UP (ref 3.5–5.3)
POTASSIUM SERPL-SCNC: 4.9 MMOL/L — SIGNIFICANT CHANGE UP (ref 3.5–5.3)
SODIUM SERPL-SCNC: 142 MMOL/L — SIGNIFICANT CHANGE UP (ref 135–145)

## 2018-08-18 PROCEDURE — 99233 SBSQ HOSP IP/OBS HIGH 50: CPT

## 2018-08-18 PROCEDURE — 99232 SBSQ HOSP IP/OBS MODERATE 35: CPT

## 2018-08-18 RX ADMIN — Medication 40 MILLIGRAM(S): at 05:56

## 2018-08-18 RX ADMIN — SODIUM CHLORIDE 100 MILLILITER(S): 9 INJECTION INTRAMUSCULAR; INTRAVENOUS; SUBCUTANEOUS at 06:05

## 2018-08-18 RX ADMIN — Medication 25 MILLIGRAM(S): at 17:36

## 2018-08-18 RX ADMIN — Medication 250 MILLIGRAM(S): at 17:36

## 2018-08-18 RX ADMIN — Medication 25 MILLIGRAM(S): at 05:56

## 2018-08-18 RX ADMIN — ENOXAPARIN SODIUM 40 MILLIGRAM(S): 100 INJECTION SUBCUTANEOUS at 11:28

## 2018-08-18 RX ADMIN — Medication 40 MILLIGRAM(S): at 17:36

## 2018-08-18 RX ADMIN — Medication 3 MILLILITER(S): at 15:19

## 2018-08-18 RX ADMIN — AZITHROMYCIN 250 MILLIGRAM(S): 500 TABLET, FILM COATED ORAL at 11:28

## 2018-08-18 RX ADMIN — PANTOPRAZOLE SODIUM 40 MILLIGRAM(S): 20 TABLET, DELAYED RELEASE ORAL at 05:56

## 2018-08-18 RX ADMIN — Medication 250 MILLIGRAM(S): at 05:56

## 2018-08-18 RX ADMIN — Medication 3 MILLILITER(S): at 02:50

## 2018-08-18 RX ADMIN — Medication 3 MILLILITER(S): at 20:31

## 2018-08-18 RX ADMIN — Medication 3 MILLILITER(S): at 10:45

## 2018-08-18 NOTE — PROGRESS NOTE ADULT - ASSESSMENT
-AECOPD  -Severe emphysema  -hypoxic resp failure  -RUL nodule; scar vs neoplasm  -Severe SOB; unclear how acute. Certainly, the severe emphysema is not new. Should R/O VTE dz given the severe symptoms.     RECC:  Steroid with taper. Nebs.   STAT d-dimer, although given age, hospitalization, may give fasle positive. O2 need to be arranged for home prior to d/c. Will need outpt CT chest f/u in 3-6 months.

## 2018-08-18 NOTE — PROGRESS NOTE ADULT - SUBJECTIVE AND OBJECTIVE BOX
PULMONARY PROGRESS NOTE      GUERLINE ANDRADEMARIFER-[id number]    Patient is a 73y old  Male who presents with a chief complaint of SOB (15 Aug 2018 14:26)      INTERVAL HPI/OVERNIGHT EVENTS: Still very sob even with minimal walking. No cough, wheeze. In talking to patient, it is unclear how chronic this is; he says he is active but when asked further, it was mainly years ago he was active.     MEDICATIONS  (STANDING):  ALBUTerol/ipratropium for Nebulization 3 milliLiter(s) Nebulizer every 6 hours  azithromycin   Tablet 250 milliGRAM(s) Oral daily  enoxaparin Injectable 40 milliGRAM(s) SubCutaneous daily  methylPREDNISolone sodium succinate Injectable 40 milliGRAM(s) IV Push two times a day  metoprolol tartrate 25 milliGRAM(s) Oral two times a day  pantoprazole    Tablet 40 milliGRAM(s) Oral before breakfast  saccharomyces boulardii 250 milliGRAM(s) Oral two times a day      MEDICATIONS  (PRN):  acetaminophen   Tablet 650 milliGRAM(s) Oral every 6 hours PRN For Temp greater than 38 C (100.4 F)  acetaminophen   Tablet. 650 milliGRAM(s) Oral every 6 hours PRN Mild Pain (1 - 3)  ALBUTerol/ipratropium for Nebulization 3 milliLiter(s) Nebulizer every 3 hours PRN Shortness of Breath      Allergies    No Known Allergies    Intolerances        PAST MEDICAL & SURGICAL HISTORY:  Essential hypertension  Chronic obstructive pulmonary disease, unspecified COPD type  H/O hernia repair      SOCIAL HISTORY  Smoking History: former      REVIEW OF SYSTEMS:    CONSTITUTIONAL:  No distress    HEENT:  Eyes:  No diplopia or blurred vision. ENT:  No earache, sore throat or runny nose.    CARDIOVASCULAR:  No pressure, squeezing, tightness, heaviness or aching about the chest; no palpitations.    RESPIRATORY:  per HPI     GASTROINTESTINAL:  No nausea, vomiting or diarrhea.    GENITOURINARY:  No dysuria, frequency or urgency.    NEUROLOGIC:  No paresthesias, fasciculations, seizures or weakness.    PSYCHIATRIC:  No disorder of thought or mood.    Vital Signs Last 24 Hrs  T(C): 36.7 (18 Aug 2018 07:40), Max: 36.8 (18 Aug 2018 00:25)  T(F): 98 (18 Aug 2018 07:40), Max: 98.3 (18 Aug 2018 00:25)  HR: 81 (18 Aug 2018 07:40) (71 - 110)  BP: 135/88 (18 Aug 2018 07:40) (135/88 - 152/94)  BP(mean): --  RR: 18 (18 Aug 2018 07:40) (18 - 18)  SpO2: 98% (18 Aug 2018 07:40) (97% - 100%)    PHYSICAL EXAMINATION:    GENERAL: The patient is awake and alert in no apparent distress.     HEENT: Head is normocephalic and atraumatic.  Mucous membranes are moist.    NECK: Supple.    LUNGS: Clear to auscultation without wheezing, rales or rhonchi; respirations unlabored    HEART: Regular rate and rhythm      ABDOMEN: Soft, nontender, and nondistended.      EXTREMITIES: Without any cyanosis, clubbing, rash, lesions or edema.    NEUROLOGIC: Grossly intact.    LABS:    08-18    142  |  104  |  23.0<H>  ----------------------------<  106  4.9   |  26.0  |  0.72    Ca    9.1      18 Aug 2018 07:08  Phos  2.9     08-18  Mg     2.3     08-18             Serum Pro-Brain Natriuretic Peptide: 98 pg/mL (08-15-18 @ 13:15)

## 2018-08-18 NOTE — PROGRESS NOTE ADULT - SUBJECTIVE AND OBJECTIVE BOX
seen for COPD exac    episodes of sinus tachycardia yesterday with ambulation.  few episodes of SVT to 160's.  no acute complaints prior to ambulation  severe Dyspnea on exertion and tachypnea after ambulating 25 feet  improved with o2 supplementation.  ROS otherwise negative     MEDICATIONS  (STANDING):  ALBUTerol/ipratropium for Nebulization 3 milliLiter(s) Nebulizer every 6 hours  azithromycin   Tablet 250 milliGRAM(s) Oral daily  enoxaparin Injectable 40 milliGRAM(s) SubCutaneous daily  methylPREDNISolone sodium succinate Injectable 40 milliGRAM(s) IV Push two times a day  metoprolol tartrate 25 milliGRAM(s) Oral two times a day  pantoprazole    Tablet 40 milliGRAM(s) Oral before breakfast  saccharomyces boulardii 250 milliGRAM(s) Oral two times a day    MEDICATIONS  (PRN):  acetaminophen   Tablet 650 milliGRAM(s) Oral every 6 hours PRN For Temp greater than 38 C (100.4 F)  acetaminophen   Tablet. 650 milliGRAM(s) Oral every 6 hours PRN Mild Pain (1 - 3)  ALBUTerol/ipratropium for Nebulization 3 milliLiter(s) Nebulizer every 3 hours PRN Shortness of Breath      Allergies    No Known Allergies    Vital Signs Last 24 Hrs  T(C): 36.7 (18 Aug 2018 07:40), Max: 36.8 (18 Aug 2018 00:25)  T(F): 98 (18 Aug 2018 07:40), Max: 98.3 (18 Aug 2018 00:25)  HR: 81 (18 Aug 2018 07:40) (71 - 110)  BP: 135/88 (18 Aug 2018 07:40) (135/88 - 152/94)  BP(mean): --  RR: 18 (18 Aug 2018 07:40) (18 - 18)  SpO2: 98% (18 Aug 2018 07:40) (97% - 100%)    PHYSICAL EXAM:    GENERAL: NAD  CHEST/LUNG: ctab, diminished bs  HEART: Regular rate and rhythm; S1 S2  ABDOMEN: Soft, Nontender, Nondistended; Bowel sounds present  EXTREMITIES: no edema  NERVOUS SYSTEM:  Alert & Oriented X3 nonfocal    LABS:    08-18    142  |  104  |  23.0<H>  ----------------------------<  106  4.9   |  26.0  |  0.72    Ca    9.1      18 Aug 2018 07:08  Phos  2.9     08-18  Mg     2.3     08-18            CAPILLARY BLOOD GLUCOSE            RADIOLOGY & ADDITIONAL TESTS:

## 2018-08-18 NOTE — PROGRESS NOTE ADULT - ASSESSMENT
P73 y/o male with PMHx of COPD not home O2, HTN, ex-smoker, presented to ER with worsening SOB, cough and limited activity due to SOB, In ER noted to have high WBC, CXR clean. Admitted to medicine for worsening SOB likely from COPD.     COPD exacerbation:  - home o2 evaluation close to dc  - c/w IV steroids 40mg bid for now c/w nebs  - pulmonary following    Leukocytosis: resolving  - no sign of infection, afebrile, lactate normal, CXR with no infection.   - Zithromax for COPD as above.    SVT: low dose lopressor with improvement.  sinus tachycardia due to ambulation/exertion.    >H/o HTN- BP stable here. On Lotrel 10/20 at home, per pharmacy last picked up in Feb/18, ? non compliance. Keep on hydralazine prn for now, If BP elevated consider resuming home meds.   >DVT ppx- Lovenox.    dc monday?

## 2018-08-19 PROCEDURE — 99232 SBSQ HOSP IP/OBS MODERATE 35: CPT

## 2018-08-19 RX ORDER — AZITHROMYCIN 500 MG/1
250 TABLET, FILM COATED ORAL DAILY
Qty: 0 | Refills: 0 | Status: COMPLETED | OUTPATIENT
Start: 2018-08-20 | End: 2018-08-20

## 2018-08-19 RX ADMIN — Medication 25 MILLIGRAM(S): at 17:12

## 2018-08-19 RX ADMIN — ENOXAPARIN SODIUM 40 MILLIGRAM(S): 100 INJECTION SUBCUTANEOUS at 11:58

## 2018-08-19 RX ADMIN — Medication 40 MILLIGRAM(S): at 17:12

## 2018-08-19 RX ADMIN — Medication 250 MILLIGRAM(S): at 06:12

## 2018-08-19 RX ADMIN — Medication 40 MILLIGRAM(S): at 17:10

## 2018-08-19 RX ADMIN — AZITHROMYCIN 250 MILLIGRAM(S): 500 TABLET, FILM COATED ORAL at 12:00

## 2018-08-19 RX ADMIN — Medication 250 MILLIGRAM(S): at 17:11

## 2018-08-19 RX ADMIN — PANTOPRAZOLE SODIUM 40 MILLIGRAM(S): 20 TABLET, DELAYED RELEASE ORAL at 06:11

## 2018-08-19 RX ADMIN — Medication 3 MILLILITER(S): at 04:18

## 2018-08-19 RX ADMIN — Medication 25 MILLIGRAM(S): at 06:11

## 2018-08-19 RX ADMIN — Medication 3 MILLILITER(S): at 22:05

## 2018-08-19 RX ADMIN — Medication 3 MILLILITER(S): at 09:42

## 2018-08-19 RX ADMIN — Medication 40 MILLIGRAM(S): at 06:11

## 2018-08-19 RX ADMIN — Medication 3 MILLILITER(S): at 15:27

## 2018-08-19 NOTE — PROGRESS NOTE ADULT - SUBJECTIVE AND OBJECTIVE BOX
PULMONARY PROGRESS NOTE      GUERLINE ANDRADEMARIFER-[id number]    Patient is a 73y old  Male who presents with a chief complaint of SOB (15 Aug 2018 14:26)      INTERVAL HPI/OVERNIGHT EVENTS: He says he is feeling better. less cough. Still unclear what his baseline is.     MEDICATIONS  (STANDING):  ALBUTerol/ipratropium for Nebulization 3 milliLiter(s) Nebulizer every 6 hours  enoxaparin Injectable 40 milliGRAM(s) SubCutaneous daily  methylPREDNISolone sodium succinate Injectable 40 milliGRAM(s) IV Push once  metoprolol tartrate 25 milliGRAM(s) Oral two times a day  pantoprazole    Tablet 40 milliGRAM(s) Oral before breakfast  predniSONE   Tablet   Oral   saccharomyces boulardii 250 milliGRAM(s) Oral two times a day      MEDICATIONS  (PRN):  acetaminophen   Tablet 650 milliGRAM(s) Oral every 6 hours PRN For Temp greater than 38 C (100.4 F)  acetaminophen   Tablet. 650 milliGRAM(s) Oral every 6 hours PRN Mild Pain (1 - 3)  ALBUTerol/ipratropium for Nebulization 3 milliLiter(s) Nebulizer every 3 hours PRN Shortness of Breath      Allergies    No Known Allergies    Intolerances        PAST MEDICAL & SURGICAL HISTORY:  Essential hypertension  Chronic obstructive pulmonary disease, unspecified COPD type  H/O hernia repair      SOCIAL HISTORY  Smoking History: former      REVIEW OF SYSTEMS:    CONSTITUTIONAL:  No distress    HEENT:  Eyes:  No diplopia or blurred vision. ENT:  No earache, sore throat or runny nose.    CARDIOVASCULAR:  No pressure, squeezing, tightness, heaviness or aching about the chest; no palpitations.    RESPIRATORY:  per HPI     GASTROINTESTINAL:  No nausea, vomiting or diarrhea.    GENITOURINARY:  No dysuria, frequency or urgency.    NEUROLOGIC:  No paresthesias, fasciculations, seizures or weakness.    PSYCHIATRIC:  No disorder of thought or mood.    Vital Signs Last 24 Hrs  T(C): 36.9 (19 Aug 2018 11:06), Max: 37.1 (18 Aug 2018 17:04)  T(F): 98.5 (19 Aug 2018 11:06), Max: 98.7 (18 Aug 2018 17:04)  HR: 73 (19 Aug 2018 11:06) (70 - 112)  BP: 145/89 (19 Aug 2018 11:06) (126/73 - 168/88)  BP(mean): --  RR: 18 (19 Aug 2018 11:06) (18 - 18)  SpO2: 99% (19 Aug 2018 11:06) (98% - 100%)    PHYSICAL EXAMINATION:    GENERAL: The patient is awake and alert in no apparent distress.     HEENT: Head is normocephalic and atraumatic.  Mucous membranes are moist.    NECK: Supple.    LUNGS: decreased apices, no wheeze, rales; respirations unlabored    HEART: Regular rate and rhythm      ABDOMEN: Soft, nontender, and nondistended.      EXTREMITIES: Without any cyanosis, clubbing, rash, lesions or edema.    NEUROLOGIC: Grossly intact.

## 2018-08-19 NOTE — PROGRESS NOTE ADULT - SUBJECTIVE AND OBJECTIVE BOX
seen for COPD exac    no acute complaints  breathing at baseline  ros otherwise negative     MEDICATIONS  (STANDING):  ALBUTerol/ipratropium for Nebulization 3 milliLiter(s) Nebulizer every 6 hours  enoxaparin Injectable 40 milliGRAM(s) SubCutaneous daily  methylPREDNISolone sodium succinate Injectable 40 milliGRAM(s) IV Push once  metoprolol tartrate 25 milliGRAM(s) Oral two times a day  pantoprazole    Tablet 40 milliGRAM(s) Oral before breakfast  predniSONE   Tablet   Oral   saccharomyces boulardii 250 milliGRAM(s) Oral two times a day    MEDICATIONS  (PRN):  acetaminophen   Tablet 650 milliGRAM(s) Oral every 6 hours PRN For Temp greater than 38 C (100.4 F)  acetaminophen   Tablet. 650 milliGRAM(s) Oral every 6 hours PRN Mild Pain (1 - 3)  ALBUTerol/ipratropium for Nebulization 3 milliLiter(s) Nebulizer every 3 hours PRN Shortness of Breath      Allergies    No Known Allergies    Vital Signs Last 24 Hrs  T(C): 36.9 (19 Aug 2018 11:06), Max: 37.1 (18 Aug 2018 17:04)  T(F): 98.5 (19 Aug 2018 11:06), Max: 98.7 (18 Aug 2018 17:04)  HR: 73 (19 Aug 2018 11:06) (70 - 112)  BP: 145/89 (19 Aug 2018 11:06) (126/73 - 168/88)  BP(mean): --  RR: 18 (19 Aug 2018 11:06) (18 - 18)  SpO2: 99% (19 Aug 2018 11:06) (98% - 100%)    PHYSICAL EXAM:    GENERAL: NAD  CHEST/LUNG: clear but diminished bs   HEART: Regular rate and rhythm; S1 S2  ABDOMEN: Soft, Nontender, Nondistended; Bowel sounds present  EXTREMITIES:  no edema      LABS:    08-18    142  |  104  |  23.0<H>  ----------------------------<  106  4.9   |  26.0  |  0.72    Ca    9.1      18 Aug 2018 07:08  Phos  2.9     08-18  Mg     2.3     08-18            CAPILLARY BLOOD GLUCOSE            RADIOLOGY & ADDITIONAL TESTS:

## 2018-08-19 NOTE — PROGRESS NOTE ADULT - ASSESSMENT
74 y/o male with PMHx of COPD not home O2, HTN, ex-smoker, presented to ER with worsening SOB, cough and limited activity due to SOB, In ER noted to have high WBC, CXR clean. Admitted to medicine for worsening SOB likely from COPD.     COPD exacerbation:  - home o2 evaluation close to dc  - IV steroids today, po pred taper tomorrow  - pulmonary following--CT angio pending per pulm recs    Leukocytosis: resolving  - no sign of infection, afebrile, lactate normal, CXR with no infection.   - 5 days of Zpack    SVT: low dose lopressor with improvement.  sinus tachycardia due to ambulation/exertion.    >H/o HTN- BP stable here. On Lotrel 10/20 at home, per pharmacy last picked up in Feb/18, ? non compliance. Keep on hydralazine prn for now, If BP elevated consider resuming home meds.   >DVT ppx- Lovenox.    dc monday?

## 2018-08-19 NOTE — PROGRESS NOTE ADULT - ASSESSMENT
-AECOPD  -Severe emphysema  -hypoxic resp failure  -RUL nodule; scar vs neoplasm  -Severe SOB; unclear how acute the progressions. Very SOB even with minimal walking. Certainly, the severe emphysema is not new but not a reliable historian on when this started. Elevated d-dimer is very nonspecific and clinical suspicion not high but still should R/O VTE dz given the severe symptoms.     RECC:  Steroid with taper. Nebs.   CTA ordered. O2 need to be arranged for home prior to d/c. Will need outpt CT chest f/u in 3-6 months. D/C planning. Pulm rehab.

## 2018-08-20 ENCOUNTER — TRANSCRIPTION ENCOUNTER (OUTPATIENT)
Age: 73
End: 2018-08-20

## 2018-08-20 LAB
ANION GAP SERPL CALC-SCNC: 12 MMOL/L — SIGNIFICANT CHANGE UP (ref 5–17)
BUN SERPL-MCNC: 23 MG/DL — HIGH (ref 8–20)
CALCIUM SERPL-MCNC: 9 MG/DL — SIGNIFICANT CHANGE UP (ref 8.6–10.2)
CHLORIDE SERPL-SCNC: 100 MMOL/L — SIGNIFICANT CHANGE UP (ref 98–107)
CO2 SERPL-SCNC: 27 MMOL/L — SIGNIFICANT CHANGE UP (ref 22–29)
CREAT SERPL-MCNC: 0.74 MG/DL — SIGNIFICANT CHANGE UP (ref 0.5–1.3)
CULTURE RESULTS: SIGNIFICANT CHANGE UP
CULTURE RESULTS: SIGNIFICANT CHANGE UP
GLUCOSE SERPL-MCNC: 156 MG/DL — HIGH (ref 70–115)
POTASSIUM SERPL-MCNC: 5.4 MMOL/L — HIGH (ref 3.5–5.3)
POTASSIUM SERPL-SCNC: 5.4 MMOL/L — HIGH (ref 3.5–5.3)
SODIUM SERPL-SCNC: 139 MMOL/L — SIGNIFICANT CHANGE UP (ref 135–145)
SPECIMEN SOURCE: SIGNIFICANT CHANGE UP
SPECIMEN SOURCE: SIGNIFICANT CHANGE UP

## 2018-08-20 PROCEDURE — 99232 SBSQ HOSP IP/OBS MODERATE 35: CPT

## 2018-08-20 PROCEDURE — 71275 CT ANGIOGRAPHY CHEST: CPT | Mod: 26

## 2018-08-20 PROCEDURE — 99233 SBSQ HOSP IP/OBS HIGH 50: CPT

## 2018-08-20 RX ADMIN — Medication 3 MILLILITER(S): at 15:41

## 2018-08-20 RX ADMIN — Medication 40 MILLIGRAM(S): at 05:38

## 2018-08-20 RX ADMIN — ENOXAPARIN SODIUM 40 MILLIGRAM(S): 100 INJECTION SUBCUTANEOUS at 11:51

## 2018-08-20 RX ADMIN — Medication 25 MILLIGRAM(S): at 17:47

## 2018-08-20 RX ADMIN — Medication 3 MILLILITER(S): at 03:57

## 2018-08-20 RX ADMIN — Medication 3 MILLILITER(S): at 21:28

## 2018-08-20 RX ADMIN — Medication 3 MILLILITER(S): at 09:51

## 2018-08-20 RX ADMIN — AZITHROMYCIN 250 MILLIGRAM(S): 500 TABLET, FILM COATED ORAL at 11:52

## 2018-08-20 RX ADMIN — Medication 250 MILLIGRAM(S): at 05:38

## 2018-08-20 RX ADMIN — Medication 25 MILLIGRAM(S): at 05:38

## 2018-08-20 RX ADMIN — PANTOPRAZOLE SODIUM 40 MILLIGRAM(S): 20 TABLET, DELAYED RELEASE ORAL at 05:38

## 2018-08-20 NOTE — DISCHARGE NOTE ADULT - PATIENT PORTAL LINK FT
You can access the [facility] Portal, offered by [facility], by registering with the following website: [email or web address]

## 2018-08-20 NOTE — PROGRESS NOTE ADULT - ATTENDING COMMENTS
Pt is seen and examined, report feeling better, still requiring additional oxygen to maintain saturation, report mild cough, start Prednisone taper, nebs, CTA pending to r/o PE.  encourage IS

## 2018-08-20 NOTE — PROGRESS NOTE ADULT - ASSESSMENT
72 y/o male with PMHx of COPD not home O2, HTN, ex-smoker, presented to ER with worsening SOB, cough and limited activity due to SOB, In ER noted to have high WBC, CXR clean. Admitted to medicine for worsening SOB likely from COPD.     COPD exacerbation:  - home o2 evaluation pending  - Prednisone PO taper  - pulmonary following--CT angio pending    Leukocytosis: resolving  - no sign of infection, afebrile, lactate normal, CXR with no infection.   - Complete 5 days of Z-pack    SVT: Continue low dose lopressor.  Sinus tachycardia due to ambulation/exertion.    >H/o HTN- BP stable here. On Lotrel 10/20 at home, per pharmacy last picked up in Feb/18, ? non compliance. Keep on hydralazine prn for now and low dose Metoprolol.    >DVT ppx- Lovenox.

## 2018-08-20 NOTE — PROGRESS NOTE ADULT - SUBJECTIVE AND OBJECTIVE BOX
PULMONARY PROGRESS NOTE      [id number]    Patient is a 73y old  Male who presents with a chief complaint of SOB (15 Aug 2018 14:26)      INTERVAL HPI/OVERNIGHT EVENTS:Offers no complaints.  Has not ambulated yet.  No cough.    MEDICATIONS  (STANDING):  ALBUTerol/ipratropium for Nebulization 3 milliLiter(s) Nebulizer every 6 hours  azithromycin   Tablet 250 milliGRAM(s) Oral daily  enoxaparin Injectable 40 milliGRAM(s) SubCutaneous daily  metoprolol tartrate 25 milliGRAM(s) Oral two times a day  pantoprazole    Tablet 40 milliGRAM(s) Oral before breakfast  predniSONE   Tablet   Oral   predniSONE   Tablet 40 milliGRAM(s) Oral daily  saccharomyces boulardii 250 milliGRAM(s) Oral two times a day      MEDICATIONS  (PRN):  acetaminophen   Tablet 650 milliGRAM(s) Oral every 6 hours PRN For Temp greater than 38 C (100.4 F)  acetaminophen   Tablet. 650 milliGRAM(s) Oral every 6 hours PRN Mild Pain (1 - 3)  ALBUTerol/ipratropium for Nebulization 3 milliLiter(s) Nebulizer every 3 hours PRN Shortness of Breath      Allergies    No Known Allergies    Intolerances        PAST MEDICAL & SURGICAL HISTORY:  Essential hypertension  Chronic obstructive pulmonary disease, unspecified COPD type  H/O hernia repair      SOCIAL HISTORY  Smoking History:       REVIEW OF SYSTEMS:    CONSTITUTIONAL:  No distress    HEENT:  Eyes:  No diplopia or blurred vision. ENT:  No earache, sore throat or runny nose.    CARDIOVASCULAR:  No pressure, squeezing, tightness, heaviness or aching about the chest; no palpitations.    RESPIRATORY: Mild SOBOE    GASTROINTESTINAL:  No nausea, vomiting or diarrhea.    GENITOURINARY:  No dysuria, frequency or urgency.    MUSCULOSKELETAL:  No joint pain    SKIN:  No new lesions.    NEUROLOGIC:  No paresthesias, fasciculations, seizures or weakness.    PSYCHIATRIC:  No disorder of thought or mood.    ENDOCRINE:  No heat or cold intolerance, polyuria or polydipsia.    HEMATOLOGICAL:  No easy bruising or bleeding.     Vital Signs Last 24 Hrs  T(C): 36.6 (20 Aug 2018 08:24), Max: 36.9 (19 Aug 2018 11:06)  T(F): 97.8 (20 Aug 2018 08:24), Max: 98.5 (19 Aug 2018 11:06)  HR: 62 (20 Aug 2018 08:24) (62 - 93)  BP: 156/98 (20 Aug 2018 08:24) (140/88 - 156/98)  BP(mean): --  RR: 18 (20 Aug 2018 08:24) (18 - 18)  SpO2: 98% (20 Aug 2018 08:24) (95% - 99%)    PHYSICAL EXAMINATION:    GENERAL: The patient is awake and alert in no apparent distress.     HEENT: Head is normocephalic and atraumatic. Extraocular muscles are intact. Mucous membranes are moist.    NECK: Supple.    LUNGS: diminished bs bilat.    HEART: Regular rate and rhythm without murmur.    ABDOMEN: Soft, nontender, and nondistended.      EXTREMITIES: Without any cyanosis, clubbing, rash, lesions or edema.    NEUROLOGIC: Grossly intact.    SKIN: No ulceration or induration present.      LABS:    08-20    139  |  100  |  23.0<H>  ----------------------------<  156<H>  5.4<H>   |  27.0  |  0.74    Ca    9.0      20 Aug 2018 06:26                          MICROBIOLOGY:    RADIOLOGY & ADDITIONAL STUDIES:

## 2018-08-20 NOTE — DISCHARGE NOTE ADULT - CARE PROVIDER_API CALL
Dunphy, Ronald (), Family Medicine  [address] [place] [address]  Phone: [phone]  Fax: [phone]    Teresita Lyons), Internal Medicine; Pulmonary Disease; Sleep Medicine  [address] [place] [address]  Phone: [phone]  Fax: [phone]

## 2018-08-20 NOTE — DISCHARGE NOTE ADULT - OTHER SIGNIFICANT FINDINGS
stable for discharge - no need of home oxygen  c/w PO prednisone taper, nebs  f/u Pulmonary as an outpatient

## 2018-08-20 NOTE — DISCHARGE NOTE ADULT - MEDICATION SUMMARY - MEDICATIONS TO TAKE
I will START or STAY ON the medications listed below when I get home from the hospital:    predniSONE 10 mg oral tablet  -- 4 tab(s) by mouth once a day x1day, then 3tabs by mouth daily x3days, then 2tabs by mouth daily x3days, then 1tab by mouth daily x3days  -- Indication: For COPD exacerbation    metoprolol tartrate 25 mg oral tablet  -- 1 tab(s) by mouth 2 times a day  -- Indication: For Essential hypertension    ipratropium-albuterol 0.5 mg-2.5 mg/3 mLinhalation solution  -- 3 milliliter(s) inhaled every 3 hours, As needed, Shortness of Breath  -- Indication: For Chronic obstructive pulmonary disease, unspecified COPD type    pantoprazole 40 mg oral delayed release tablet  -- 1 tab(s) by mouth once a day (before a meal)  -- Indication: For GERD I will START or STAY ON the medications listed below when I get home from the hospital:    nebulizer  -- J 44.9  -- Indication: For Chronic obstructive pulmonary disease with acute exacerbation    predniSONE 10 mg oral tablet  -- 4 tab(s) by mouth once a day x1day, then 3tabs by mouth daily x3days, then 2tabs by mouth daily x3days, then 1tab by mouth daily x3days  -- Indication: For COPD exacerbation    amlodipine-benazepril 10 mg-20 mg oral capsule  -- 1 cap(s) by mouth once a day  -- Indication: For Htn    metoprolol tartrate 25 mg oral tablet  -- 1 tab(s) by mouth 2 times a day  -- Indication: For Essential hypertension    ipratropium-albuterol 0.5 mg-2.5 mg/3 mLinhalation solution  -- 3 milliliter(s) inhaled every 3 hours, As needed, Shortness of Breath  -- Indication: For Chronic obstructive pulmonary disease with acute exacerbation    Advair Diskus 250 mcg-50 mcg inhalation powder  -- 1 puff(s) inhaled 2 times a day  -- Indication: For Chronic obstructive pulmonary disease with acute exacerbation    pantoprazole 40 mg oral delayed release tablet  -- 1 tab(s) by mouth once a day (before a meal)  -- Indication: For GERD

## 2018-08-20 NOTE — DISCHARGE NOTE ADULT - HOSPITAL COURSE
74 y/o male with PMHx of COPD not home O2, HTN, ex-smoker, presented to ER with worsening SOB, cough and limited activity due to SOB, In ER noted to have high WBC, CXR clean. Admitted to medicine for worsening SOB secondary to COPD exacerbation.  Patient treated with PO Zithromax and IV steroids and nebs.  Patient improved and transitioned to PO Prednisone taper.  Patient evaluated for home O2 and Home O2 not required.  Patient stable for discharge to home with outpatient follow up with primary doctor and pulmonary.

## 2018-08-20 NOTE — DISCHARGE NOTE ADULT - CARE PLAN
Principal Discharge DX:	COPD exacerbation  Goal:	Improved  Assessment and plan of treatment:	Complete steroid taper.  Follow up with pulmonary doctor.  Secondary Diagnosis:	Essential hypertension  Assessment and plan of treatment:	Continue current medications as prescribed.  Follow up with primary doctor. Principal Discharge DX:	COPD exacerbation  Goal:	Improved  Assessment and plan of treatment:	Complete steroid taper.  Follow up with pulmonary doctor.  Secondary Diagnosis:	Essential hypertension  Assessment and plan of treatment:	Continue home medication  Follow up with primary doctor.

## 2018-08-20 NOTE — PROGRESS NOTE ADULT - ASSESSMENT
Imp--COPD exac improved.  Plan--continue current rx.    CTPA today.  Ambulate pt with O2  evaluate for home O2.

## 2018-08-20 NOTE — DISCHARGE NOTE ADULT - HOME CARE AGENCY
Physician: Teresita Lyons  Address: [address] [place] [address]  Phone Number: [phone]    Date and Time of appointment: Tuesday 8/28/2018 at 12:15

## 2018-08-20 NOTE — PROGRESS NOTE ADULT - SUBJECTIVE AND OBJECTIVE BOX
CC: F/u COPD exacerbation    HPI:  72 y/o male with PMHx of COPD not home O2, HTN, ex-smoker, presented to ER with worsening SOB.     INTERVAL HPI/OVERNIGHT EVENTS: Patient seen and examined lying in bed.  Patient reports PEARSON.  Patient denies any headache, dizziness, CP, abdominal pain, nausea, vomiting, dysuria.  Other ROS reviewed and are negative.    Vital Signs Last 24 Hrs  T(C): 36.6 (20 Aug 2018 08:24), Max: 36.7 (20 Aug 2018 03:30)  T(F): 97.8 (20 Aug 2018 08:24), Max: 98.1 (20 Aug 2018 03:30)  HR: 62 (20 Aug 2018 08:24) (62 - 93)  BP: 156/98 (20 Aug 2018 08:24) (140/88 - 156/98)  BP(mean): --  RR: 18 (20 Aug 2018 08:24) (18 - 18)  SpO2: 98% (20 Aug 2018 08:24) (95% - 99%)  I&O's Detail            20 Aug 2018 06:26    139    |  100    |  23.0   ----------------------------<  156    5.4     |  27.0   |  0.74     Ca    9.0        20 Aug 2018 06:26        CAPILLARY BLOOD GLUCOSE            Hemoglobin A1C, Whole Blood: 5.2 % (08-16-18 @ 06:59)    MEDICATIONS  (STANDING):  ALBUTerol/ipratropium for Nebulization 3 milliLiter(s) Nebulizer every 6 hours  enoxaparin Injectable 40 milliGRAM(s) SubCutaneous daily  metoprolol tartrate 25 milliGRAM(s) Oral two times a day  pantoprazole    Tablet 40 milliGRAM(s) Oral before breakfast  predniSONE   Tablet   Oral   predniSONE   Tablet 40 milliGRAM(s) Oral daily  saccharomyces boulardii 250 milliGRAM(s) Oral two times a day    MEDICATIONS  (PRN):  acetaminophen   Tablet 650 milliGRAM(s) Oral every 6 hours PRN For Temp greater than 38 C (100.4 F)  acetaminophen   Tablet. 650 milliGRAM(s) Oral every 6 hours PRN Mild Pain (1 - 3)  ALBUTerol/ipratropium for Nebulization 3 milliLiter(s) Nebulizer every 3 hours PRN Shortness of Breath      RADIOLOGY & ADDITIONAL TESTS: CC: F/u COPD exacerbation    HPI:  72 y/o male with PMHx of COPD not home O2, HTN, ex-smoker, presented to ER with worsening SOB.     INTERVAL HPI/OVERNIGHT EVENTS: Patient seen and examined lying in bed.  Patient reports PEARSON.  Patient denies any headache, dizziness, CP, abdominal pain, nausea, vomiting, dysuria.  Other ROS reviewed and are negative.    Vital Signs Last 24 Hrs  T(C): 36.6 (20 Aug 2018 08:24), Max: 36.7 (20 Aug 2018 03:30)  T(F): 97.8 (20 Aug 2018 08:24), Max: 98.1 (20 Aug 2018 03:30)  HR: 62 (20 Aug 2018 08:24) (62 - 93)  BP: 156/98 (20 Aug 2018 08:24) (140/88 - 156/98)  BP(mean): --  RR: 18 (20 Aug 2018 08:24) (18 - 18)  SpO2: 98% (20 Aug 2018 08:24) (95% - 99%)  I&O's Detail      PHYSICAL EXAM:  GENERAL: NAD  HEAD:  Atraumatic, Normocephalic  NECK: Supple, No JVD, Normal thyroid  NERVOUS SYSTEM:  Alert & Oriented X3, Good concentration; Motor Strength 5/5 B/L upper and lower extremities  CHEST/LUNG: Diminished BS bilaterally  HEART: Regular rate and rhythm; No murmurs, rubs, or gallops  ABDOMEN: Soft, Nontender, Nondistended; Bowel sounds present  EXTREMITIES:  2+ Peripheral Pulses, No clubbing, cyanosis, or edema        20 Aug 2018 06:26    139    |  100    |  23.0   ----------------------------<  156    5.4     |  27.0   |  0.74     Ca    9.0        20 Aug 2018 06:26        Hemoglobin A1C, Whole Blood: 5.2 % (08-16-18 @ 06:59)    MEDICATIONS  (STANDING):  ALBUTerol/ipratropium for Nebulization 3 milliLiter(s) Nebulizer every 6 hours  enoxaparin Injectable 40 milliGRAM(s) SubCutaneous daily  metoprolol tartrate 25 milliGRAM(s) Oral two times a day  pantoprazole    Tablet 40 milliGRAM(s) Oral before breakfast  predniSONE   Tablet   Oral   predniSONE   Tablet 40 milliGRAM(s) Oral daily  saccharomyces boulardii 250 milliGRAM(s) Oral two times a day    MEDICATIONS  (PRN):  acetaminophen   Tablet 650 milliGRAM(s) Oral every 6 hours PRN For Temp greater than 38 C (100.4 F)  acetaminophen   Tablet. 650 milliGRAM(s) Oral every 6 hours PRN Mild Pain (1 - 3)  ALBUTerol/ipratropium for Nebulization 3 milliLiter(s) Nebulizer every 3 hours PRN Shortness of Breath

## 2018-08-20 NOTE — DISCHARGE NOTE ADULT - MEDICATION SUMMARY - MEDICATIONS TO STOP TAKING
I will STOP taking the medications listed below when I get home from the hospital:    Advair Diskus 250 mcg-50 mcg inhalation powder  -- 1 puff(s) inhaled 2 times a day    ProAir HFA  -- 2 puff(s) inhaled 4 times a day, As Needed    Lotrel 10 mg-20 mg oral capsule  -- 1 cap(s) by mouth once a day    amLODIPine 10 mg oral tablet  -- 1 tab(s) by mouth once a day    benazepril 20 mg oral tablet  -- 1 tab(s) by mouth once a day

## 2018-08-20 NOTE — DISCHARGE NOTE ADULT - PLAN OF CARE
Improved Complete steroid taper.  Follow up with pulmonary doctor. Continue current medications as prescribed.  Follow up with primary doctor. Continue home medication  Follow up with primary doctor.

## 2018-08-21 VITALS
RESPIRATION RATE: 18 BRPM | SYSTOLIC BLOOD PRESSURE: 148 MMHG | HEART RATE: 82 BPM | TEMPERATURE: 98 F | DIASTOLIC BLOOD PRESSURE: 95 MMHG | OXYGEN SATURATION: 100 %

## 2018-08-21 DIAGNOSIS — I10 ESSENTIAL (PRIMARY) HYPERTENSION: ICD-10-CM

## 2018-08-21 DIAGNOSIS — J44.1 CHRONIC OBSTRUCTIVE PULMONARY DISEASE WITH (ACUTE) EXACERBATION: ICD-10-CM

## 2018-08-21 LAB
ANION GAP SERPL CALC-SCNC: 7 MMOL/L — SIGNIFICANT CHANGE UP (ref 5–17)
BUN SERPL-MCNC: 21 MG/DL — HIGH (ref 8–20)
CALCIUM SERPL-MCNC: 8.4 MG/DL — LOW (ref 8.6–10.2)
CHLORIDE SERPL-SCNC: 100 MMOL/L — SIGNIFICANT CHANGE UP (ref 98–107)
CO2 SERPL-SCNC: 33 MMOL/L — HIGH (ref 22–29)
CREAT SERPL-MCNC: 0.72 MG/DL — SIGNIFICANT CHANGE UP (ref 0.5–1.3)
GLUCOSE SERPL-MCNC: 108 MG/DL — SIGNIFICANT CHANGE UP (ref 70–115)
HCT VFR BLD CALC: 38.6 % — LOW (ref 42–52)
HGB BLD-MCNC: 13.1 G/DL — LOW (ref 14–18)
MCHC RBC-ENTMCNC: 31.7 PG — HIGH (ref 27–31)
MCHC RBC-ENTMCNC: 33.9 G/DL — SIGNIFICANT CHANGE UP (ref 32–36)
MCV RBC AUTO: 93.5 FL — SIGNIFICANT CHANGE UP (ref 80–94)
PLATELET # BLD AUTO: 225 K/UL — SIGNIFICANT CHANGE UP (ref 150–400)
POTASSIUM SERPL-MCNC: 4 MMOL/L — SIGNIFICANT CHANGE UP (ref 3.5–5.3)
POTASSIUM SERPL-SCNC: 4 MMOL/L — SIGNIFICANT CHANGE UP (ref 3.5–5.3)
RBC # BLD: 4.13 M/UL — LOW (ref 4.6–6.2)
RBC # FLD: 14.5 % — SIGNIFICANT CHANGE UP (ref 11–15.6)
SODIUM SERPL-SCNC: 140 MMOL/L — SIGNIFICANT CHANGE UP (ref 135–145)
WBC # BLD: 11.5 K/UL — HIGH (ref 4.8–10.8)
WBC # FLD AUTO: 11.5 K/UL — HIGH (ref 4.8–10.8)

## 2018-08-21 PROCEDURE — 87040 BLOOD CULTURE FOR BACTERIA: CPT

## 2018-08-21 PROCEDURE — 85027 COMPLETE CBC AUTOMATED: CPT

## 2018-08-21 PROCEDURE — 99285 EMERGENCY DEPT VISIT HI MDM: CPT | Mod: 25

## 2018-08-21 PROCEDURE — 83605 ASSAY OF LACTIC ACID: CPT

## 2018-08-21 PROCEDURE — 96361 HYDRATE IV INFUSION ADD-ON: CPT

## 2018-08-21 PROCEDURE — 97163 PT EVAL HIGH COMPLEX 45 MIN: CPT

## 2018-08-21 PROCEDURE — 93306 TTE W/DOPPLER COMPLETE: CPT

## 2018-08-21 PROCEDURE — 83880 ASSAY OF NATRIURETIC PEPTIDE: CPT

## 2018-08-21 PROCEDURE — 71250 CT THORAX DX C-: CPT

## 2018-08-21 PROCEDURE — 99232 SBSQ HOSP IP/OBS MODERATE 35: CPT

## 2018-08-21 PROCEDURE — 85610 PROTHROMBIN TIME: CPT

## 2018-08-21 PROCEDURE — 80048 BASIC METABOLIC PNL TOTAL CA: CPT

## 2018-08-21 PROCEDURE — 71046 X-RAY EXAM CHEST 2 VIEWS: CPT

## 2018-08-21 PROCEDURE — 97116 GAIT TRAINING THERAPY: CPT

## 2018-08-21 PROCEDURE — 83735 ASSAY OF MAGNESIUM: CPT

## 2018-08-21 PROCEDURE — 85730 THROMBOPLASTIN TIME PARTIAL: CPT

## 2018-08-21 PROCEDURE — 99238 HOSP IP/OBS DSCHRG MGMT 30/<: CPT

## 2018-08-21 PROCEDURE — 83036 HEMOGLOBIN GLYCOSYLATED A1C: CPT

## 2018-08-21 PROCEDURE — 97110 THERAPEUTIC EXERCISES: CPT

## 2018-08-21 PROCEDURE — 96375 TX/PRO/DX INJ NEW DRUG ADDON: CPT

## 2018-08-21 PROCEDURE — 80053 COMPREHEN METABOLIC PANEL: CPT

## 2018-08-21 PROCEDURE — 94640 AIRWAY INHALATION TREATMENT: CPT

## 2018-08-21 PROCEDURE — 84100 ASSAY OF PHOSPHORUS: CPT

## 2018-08-21 PROCEDURE — 84484 ASSAY OF TROPONIN QUANT: CPT

## 2018-08-21 PROCEDURE — 93005 ELECTROCARDIOGRAM TRACING: CPT

## 2018-08-21 PROCEDURE — 80061 LIPID PANEL: CPT

## 2018-08-21 PROCEDURE — 96374 THER/PROPH/DIAG INJ IV PUSH: CPT

## 2018-08-21 PROCEDURE — 71275 CT ANGIOGRAPHY CHEST: CPT

## 2018-08-21 PROCEDURE — 85379 FIBRIN DEGRADATION QUANT: CPT

## 2018-08-21 PROCEDURE — 36415 COLL VENOUS BLD VENIPUNCTURE: CPT

## 2018-08-21 PROCEDURE — 94760 N-INVAS EAR/PLS OXIMETRY 1: CPT

## 2018-08-21 RX ORDER — IPRATROPIUM/ALBUTEROL SULFATE 18-103MCG
3 AEROSOL WITH ADAPTER (GRAM) INHALATION
Qty: 300 | Refills: 0 | OUTPATIENT
Start: 2018-08-21 | End: 2018-09-19

## 2018-08-21 RX ORDER — FLUTICASONE PROPIONATE AND SALMETEROL 50; 250 UG/1; UG/1
1 POWDER ORAL; RESPIRATORY (INHALATION)
Qty: 0 | Refills: 0 | COMMUNITY

## 2018-08-21 RX ORDER — BENAZEPRIL HYDROCHLORIDE 40 MG/1
1 TABLET ORAL
Qty: 0 | Refills: 0 | COMMUNITY

## 2018-08-21 RX ORDER — METOPROLOL TARTRATE 50 MG
1 TABLET ORAL
Qty: 60 | Refills: 0 | OUTPATIENT
Start: 2018-08-21 | End: 2018-09-19

## 2018-08-21 RX ORDER — AMLODIPINE BESYLATE AND BENAZEPRIL HYDROCHLORIDE 10; 20 MG/1; MG/1
1 CAPSULE ORAL
Qty: 0 | Refills: 0 | COMMUNITY

## 2018-08-21 RX ORDER — IPRATROPIUM/ALBUTEROL SULFATE 18-103MCG
3 AEROSOL WITH ADAPTER (GRAM) INHALATION
Qty: 100 | Refills: 0 | OUTPATIENT
Start: 2018-08-21

## 2018-08-21 RX ORDER — ALBUTEROL 90 UG/1
2 AEROSOL, METERED ORAL
Qty: 0 | Refills: 0 | COMMUNITY

## 2018-08-21 RX ORDER — PANTOPRAZOLE SODIUM 20 MG/1
1 TABLET, DELAYED RELEASE ORAL
Qty: 30 | Refills: 0 | OUTPATIENT
Start: 2018-08-21 | End: 2018-09-19

## 2018-08-21 RX ORDER — AMLODIPINE BESYLATE 2.5 MG/1
1 TABLET ORAL
Qty: 0 | Refills: 0 | COMMUNITY

## 2018-08-21 RX ADMIN — Medication 25 MILLIGRAM(S): at 05:41

## 2018-08-21 RX ADMIN — PANTOPRAZOLE SODIUM 40 MILLIGRAM(S): 20 TABLET, DELAYED RELEASE ORAL at 05:41

## 2018-08-21 RX ADMIN — Medication 3 MILLILITER(S): at 08:21

## 2018-08-21 RX ADMIN — Medication 3 MILLILITER(S): at 15:27

## 2018-08-21 RX ADMIN — Medication 40 MILLIGRAM(S): at 05:41

## 2018-08-21 RX ADMIN — Medication 3 MILLILITER(S): at 02:31

## 2018-08-21 RX ADMIN — Medication 25 MILLIGRAM(S): at 16:30

## 2018-08-21 NOTE — CHART NOTE - NSCHARTNOTEFT_GEN_A_CORE
Upon Nutritional Assessment by the Registered Dietitian your patient was determined to meet criteria / has evidence of the following diagnosis/diagnoses:          [ ]  Mild Protein Calorie Malnutrition        [ ]  Moderate Protein Calorie Malnutrition        [ ] Severe Protein Calorie Malnutrition        [ ] Unspecified Protein Calorie Malnutrition        [ ] Underweight / BMI <19        [ ] Morbid Obesity / BMI > 40      Findings as based on:  •  Comprehensive nutrition assessment and consultation  •  Moderate muscle wasting (temples, clavicle, calves)  •  Moderate subcutaneous fat loss (orbital, buccal)  •  Pt at 88% of IBW  •  Pt states difficulty with procurement/preparation of meals         Treatment:    The following diet has been recommended:  1) Ensure Clear TID per pt request  2) Pt not drinking Ensure Enlive, consider d/c  3) MVI daily     PROVIDER Section:     By signing this assessment you are acknowledging and agree with the diagnosis/diagnoses assigned by the Registered Dietitian    Comments: Upon Nutritional Assessment by the Registered Dietitian your patient was determined to meet criteria / has evidence of the following diagnosis/diagnoses:          [ ]  Mild Protein Calorie Malnutrition        [x]  Moderate chronic Protein Calorie Malnutrition        [ ] Severe Protein Calorie Malnutrition        [ ] Unspecified Protein Calorie Malnutrition        [ ] Underweight / BMI <19        [ ] Morbid Obesity / BMI > 40      Findings as based on:  •  Comprehensive nutrition assessment and consultation  •  Moderate muscle wasting (temples, clavicle, calves)  •  Moderate subcutaneous fat loss (orbital, buccal)  •  Pt at 88% of IBW  •  Pt states difficulty with procurement/preparation of meals         Treatment:    The following diet has been recommended:  1) Ensure Clear TID per pt request  2) Pt not drinking Ensure Enlive, consider d/c  3) MVI daily     PROVIDER Section:     By signing this assessment you are acknowledging and agree with the diagnosis/diagnoses assigned by the Registered Dietitian    Comments:

## 2018-08-21 NOTE — PROGRESS NOTE ADULT - ASSESSMENT
74 yo male with exacerbation of COPD  No evidence of PE.  Repeat CT does not demonstrate CHANDANA lesion  He does not appear to require Oxygen.  Check another O2 sat tho.  Should be ready for discharge with pulmonary f/u as outpatient.

## 2018-08-21 NOTE — DIETITIAN INITIAL EVALUATION ADULT. - OTHER INFO
Pt states appetite is good, was good pta. Pt mentions difficulty with procurement and preparation of meals for his wife and himself. Pt not drinking Ensure Enlive requests to receive Ensure clears.

## 2018-08-21 NOTE — PROGRESS NOTE ADULT - SUBJECTIVE AND OBJECTIVE BOX
CC: F/u COPD exacerbation    HPI:  72 y/o male with PMHx of COPD not home O2, HTN, ex-smoker, presented to ER with worsening SOB.     INTERVAL HPI/OVERNIGHT EVENTS: Patient seen and examined lying in bed.  Patient reports feeling better.  Patient denies any headache, dizziness, SOB, CP, abdominal pain, nausea, vomiting, dysuria.  Other ROS reviewed and are negative.  No longer requiring O2 supplementation.    Vital Signs Last 24 Hrs  T(C): 36.4 (21 Aug 2018 07:42), Max: 36.9 (20 Aug 2018 23:39)  T(F): 97.6 (21 Aug 2018 07:42), Max: 98.4 (20 Aug 2018 23:39)  HR: 68 (21 Aug 2018 07:42) (64 - 82)  BP: 140/97 (21 Aug 2018 07:42) (127/82 - 153/92)  BP(mean): --  RR: 18 (21 Aug 2018 07:42) (18 - 159)  SpO2: 100% (21 Aug 2018 07:42) (97% - 100%)  I&O's Detail      PHYSICAL EXAM:  GENERAL: NAD  HEAD:  Atraumatic, Normocephalic  NECK: Supple, No JVD, Normal thyroid  NERVOUS SYSTEM:  Alert & Oriented X3, Good concentration; Motor Strength 5/5 B/L upper and lower extremities  CHEST/LUNG: Clear to auscultation bilaterally; No rales, rhonchi, wheezing, or rubs  HEART: Regular rate and rhythm; No murmurs, rubs, or gallops  ABDOMEN: Soft, Nontender, Nondistended; Bowel sounds present  EXTREMITIES:  2+ Peripheral Pulses, No clubbing, cyanosis, or edema                            13.1   11.5  )-----------( 225      ( 21 Aug 2018 07:20 )             38.6     21 Aug 2018 07:19    140    |  100    |  21.0   ----------------------------<  108    4.0     |  33.0   |  0.72     Ca    8.4        21 Aug 2018 07:19        Hemoglobin A1C, Whole Blood: 5.2 % (08-16-18 @ 06:59)    MEDICATIONS  (STANDING):  ALBUTerol/ipratropium for Nebulization 3 milliLiter(s) Nebulizer every 6 hours  enoxaparin Injectable 40 milliGRAM(s) SubCutaneous daily  metoprolol tartrate 25 milliGRAM(s) Oral two times a day  pantoprazole    Tablet 40 milliGRAM(s) Oral before breakfast  predniSONE   Tablet   Oral   predniSONE   Tablet 40 milliGRAM(s) Oral daily    MEDICATIONS  (PRN):  acetaminophen   Tablet 650 milliGRAM(s) Oral every 6 hours PRN For Temp greater than 38 C (100.4 F)  acetaminophen   Tablet. 650 milliGRAM(s) Oral every 6 hours PRN Mild Pain (1 - 3)  ALBUTerol/ipratropium for Nebulization 3 milliLiter(s) Nebulizer every 3 hours PRN Shortness of Breath

## 2018-08-21 NOTE — PROGRESS NOTE ADULT - ATTENDING COMMENTS
Pt is feeling better, doesn't need home oxygen, c/w Prednisone taper, nebs at home  CTA negative for PE  f/u Pulmo as an outpatient   stable for discharge

## 2018-08-21 NOTE — PROGRESS NOTE ADULT - SUBJECTIVE AND OBJECTIVE BOX
PULMONARY PROGRESS NOTE      GUERLINE ANDRADEMARIFER-[id number]    Patient is a 73y old  Male who presents with a chief complaint of SOB (20 Aug 2018 12:38)  CT reviewed and shows no Pulm embolic diease and c/w significant COPD    INTERVAL HPI/OVERNIGHT EVENTS:    MEDICATIONS  (STANDING):  ALBUTerol/ipratropium for Nebulization 3 milliLiter(s) Nebulizer every 6 hours  enoxaparin Injectable 40 milliGRAM(s) SubCutaneous daily  metoprolol tartrate 25 milliGRAM(s) Oral two times a day  pantoprazole    Tablet 40 milliGRAM(s) Oral before breakfast  predniSONE   Tablet   Oral   predniSONE   Tablet 40 milliGRAM(s) Oral daily      MEDICATIONS  (PRN):  acetaminophen   Tablet 650 milliGRAM(s) Oral every 6 hours PRN For Temp greater than 38 C (100.4 F)  acetaminophen   Tablet. 650 milliGRAM(s) Oral every 6 hours PRN Mild Pain (1 - 3)  ALBUTerol/ipratropium for Nebulization 3 milliLiter(s) Nebulizer every 3 hours PRN Shortness of Breath      Allergies    No Known Allergies    Intolerances        PAST MEDICAL & SURGICAL HISTORY:  Essential hypertension  Chronic obstructive pulmonary disease, unspecified COPD type  H/O hernia repair      SOCIAL HISTORY  Smoking History:       REVIEW OF SYSTEMS:    CONSTITUTIONAL:  No distress    HEENT:  Eyes:  No diplopia or blurred vision. ENT:  No earache, sore throat or runny nose.    CARDIOVASCULAR:  No pressure, squeezing, tightness, heaviness or aching about the chest; no palpitations.    RESPIRATORY:  No cough, shortness of breath, PND or orthopnea. Mild SOBOE    GASTROINTESTINAL:  No nausea, vomiting or diarrhea.    GENITOURINARY:  No dysuria, frequency or urgency.    MUSCULOSKELETAL:  No joint pain    SKIN:  No new lesions.    NEUROLOGIC:  No paresthesias, fasciculations, seizures or weakness.    PSYCHIATRIC:  No disorder of thought or mood.    ENDOCRINE:  No heat or cold intolerance, polyuria or polydipsia.    HEMATOLOGICAL:  No easy bruising or bleeding.     Vital Signs Last 24 Hrs  T(C): 36.4 (21 Aug 2018 07:42), Max: 36.9 (20 Aug 2018 23:39)  T(F): 97.6 (21 Aug 2018 07:42), Max: 98.4 (20 Aug 2018 23:39)  HR: 68 (21 Aug 2018 07:42) (64 - 82)  BP: 140/97 (21 Aug 2018 07:42) (127/82 - 153/92)  BP(mean): --  RR: 18 (21 Aug 2018 07:42) (18 - 159)  SpO2: 100% (21 Aug 2018 07:42) (97% - 100%)    PHYSICAL EXAMINATION:    GENERAL: The patient is awake and alert in no apparent distress.     HEENT: Head is normocephalic and atraumatic. Extraocular muscles are intact. Mucous membranes are moist.    NECK: Supple.    LUNGS: Clear to auscultation without wheezing, rales or rhonchi; respirations unlabored    HEART: Regular rate and rhythm without murmur.    ABDOMEN: Soft, nontender, and nondistended.      EXTREMITIES: Without any cyanosis, clubbing, rash, lesions or edema.    NEUROLOGIC: Grossly intact.    SKIN: No ulceration or induration present.      LABS:                        13.1   11.5  )-----------( 225      ( 21 Aug 2018 07:20 )             38.6     08-21    140  |  100  |  21.0<H>  ----------------------------<  108  4.0   |  33.0<H>  |  0.72    Ca    8.4<L>      21 Aug 2018 07:19                          MICROBIOLOGY:    RADIOLOGY & ADDITIONAL STUDIES:< from: CT Chest No Cont (08.15.18 @ 15:24) >    INTERPRETATION:  HISTORY: Shortness of breath.    Date and Time of Exam: 8/15/2018 2:59 PM    TECHNIQUE:  Sections were obtained from the apices to the diaphragm   without intravenous contrast.    COMPARISON EXAMINATION:   No prior exam.    FINDINGS: No evidence of mediastinal or hilar lymphadenopathy. No   evidence of cardiomegaly. No evidence of pleural or pericardial effusion.    Coronary artery calcifications arenoted.    Bilateral emphysematous changes are noted. There is a strand of   atelectasis or fibrosis at the right lung base. The right apex there is a   small nodular density measuring less than 1 cm. The appearance suggests a   region of fibrosis or inflammation although neoplasm cannot be excluded.   The left lung is clear.    No significant osseous abnormality.      IMPRESSION:     Emphysematous changes.    Inflammatory or fibrotic focus in the right apex versus neoplastic   nodule. A follow-upCT scan of the chest is recommended in 3 months for   further evaluation of this finding..               < end of copied text >  < from: CT Chest No Cont (08.15.18 @ 15:24) >    INTERPRETATION:  HISTORY: Shortness of breath.    Date and Time of Exam: 8/15/2018 2:59 PM    TECHNIQUE:  Sections were obtained from the apices to the diaphragm   without intravenous contrast.    COMPARISON EXAMINATION:   No prior exam.    FINDINGS: No evidence of mediastinal or hilar lymphadenopathy. No   evidence of cardiomegaly. No evidence of pleural or pericardial effusion.    Coronary artery calcifications arenoted.    Bilateral emphysematous changes are noted. There is a strand of   atelectasis or fibrosis at the right lung base. The right apex there is a   small nodular density measuring less than 1 cm. The appearance suggests a   region of fibrosis or inflammation although neoplasm cannot be excluded.   The left lung is clear.    No significant osseous abnormality.      IMPRESSION:     Emphysematous changes.    Inflammatory or fibrotic focus in the right apex versus neoplastic   nodule. A follow-upCT scan of the chest is recommended in 3 months for   further evaluation of this finding..               < end of copied text >  < from: CT Angio Chest w/ IV Cont (08.20.18 @ 18:44) >      INTERPRETATION:  CTA chest .  COMPARISON: None.  CLINICAL INFORMATION: chest pain, dyspnea.  TECHNIQUE: Contiguous axial 1.25 mm slice thickness images ofthe chest   were obtained after intravenous contrast administration utilizing PE   protocol.  Maximum intensity projection,(MIP) ,  imaging was created and interpreted.  100 mls of Omnipaque 300 was administered intravenously without   complication and 0 mls were discarded.    FINDINGS:  There are no pulmonary arterial filling defects to suggest pulmonary   embolism.    The mediastinum great vessels are normal.     There are no mediastinal masses or lymphadenopathy.     The heart and pericardium are normal.      There is mild RIGHT lower lobe chronic bronchiectasis with  linear   peripheral fibrosis. No gross airspace consolidation seen within the   RIGHT LEFT lung parenchyma. There is mild emphysematous lung disease in   the upper lobes . There are no airspace consolidations.    There is no pleural effusion or pneumothorax.    Visualized upper abdominal viscera unremarkable.    The bones are normal.    IMPRESSION:    No evidence of pulmonary embolism.    < end of copied text >

## 2018-08-21 NOTE — PROGRESS NOTE ADULT - ASSESSMENT
72 y/o male with PMHx of COPD not home O2, HTN, ex-smoker, presented to ER with worsening SOB, cough and limited activity due to SOB, In ER noted to have high WBC, CXR clean. Admitted to medicine for worsening SOB likely from COPD.     COPD exacerbation:  - home o2 evaluation - Patient does not require home O2.  - Prednisone PO taper  - pulmonary following--CT angio negative.    Leukocytosis: resolving  - no sign of infection, afebrile, lactate normal, CXR with no infection.   - Completed 5 days of Z-pack    SVT: Continue low dose lopressor.  Sinus tachycardia due to ambulation/exertion.    H/o HTN- BP stable here. On Lotrel 10/20 at home, per pharmacy last picked up in Feb/18, ? non compliance. Keep on hydralazine prn for now and low dose Metoprolol.    DVT ppx- Lovenox. 74 y/o male with PMHx of COPD not home O2, HTN, ex-smoker, presented to ER with worsening SOB, cough and limited activity due to SOB, In ER noted to have high WBC, CXR clean. Admitted to medicine for worsening SOB likely from COPD.     COPD exacerbation:  - home o2 evaluation - Patient does not require home O2.  - Prednisone PO taper  - pulmonary following--CT angio negative.    Leukocytosis: resolving  - no sign of infection, afebrile, lactate normal, CXR with no infection.   - Completed 5 days of Z-pack    SVT: resolved  Continue low dose lopressor.  Sinus tachycardia due to ambulation/exertion.    H/o HTN- BP stable here. On Lotrel 10/20 at home, per pharmacy last picked up in Feb/18, ? non compliance. Keep on hydralazine prn for now and low dose Metoprolol.    DVT ppx- Lovenox.

## 2018-08-22 RX ORDER — METOPROLOL TARTRATE 25 MG/1
25 TABLET, FILM COATED ORAL TWICE DAILY
Refills: 0 | Status: ACTIVE | COMMUNITY

## 2018-08-24 VITALS
RESPIRATION RATE: 18 BRPM | HEART RATE: 76 BPM | SYSTOLIC BLOOD PRESSURE: 130 MMHG | DIASTOLIC BLOOD PRESSURE: 82 MMHG | OXYGEN SATURATION: 94 % | TEMPERATURE: 97.6 F

## 2018-08-24 DIAGNOSIS — B37.0 CANDIDAL STOMATITIS: ICD-10-CM

## 2018-08-24 RX ORDER — BENAZEPRIL HYDROCHLORIDE 20 MG/1
20 TABLET, FILM COATED ORAL DAILY
Refills: 0 | Status: ACTIVE | COMMUNITY

## 2018-08-24 RX ORDER — AMLODIPINE BESYLATE 10 MG/1
10 TABLET ORAL DAILY
Refills: 0 | Status: ACTIVE | COMMUNITY

## 2018-08-24 RX ORDER — AMLODIPINE BESYLATE AND BENAZEPRIL HYDROCHLORIDE 10; 20 MG/1; MG/1
10-20 CAPSULE ORAL DAILY
Refills: 0 | Status: DISCONTINUED | COMMUNITY
End: 2018-08-24

## 2018-08-24 NOTE — PHYSICAL EXAM
[No Acute Distress] : no acute distress [Well Developed] : well developed [Well-Appearing] : well-appearing [Normal Sclera/Conjunctiva] : normal sclera/conjunctiva [PERRL] : pupils equal round and reactive to light [EOMI] : extraocular movements intact [No JVD] : no jugular venous distention [Supple] : supple [No Lymphadenopathy] : no lymphadenopathy [No Respiratory Distress] : no respiratory distress  [Clear to Auscultation] : lungs were clear to auscultation bilaterally [No Accessory Muscle Use] : no accessory muscle use [Normal Rate] : normal rate  [Regular Rhythm] : with a regular rhythm [Normal S1, S2] : normal S1 and S2 [No Murmur] : no murmur heard [2+] : left 2+ [Soft] : abdomen soft [Non Tender] : non-tender [Non-distended] : non-distended [No Masses] : no abdominal mass palpated [Normal Bowel Sounds] : normal bowel sounds [No Spinal Tenderness] : no spinal tenderness [No Joint Swelling] : no joint swelling [Grossly Normal Strength/Tone] : grossly normal strength/tone [No Rash] : no rash [Normal Gait] : normal gait [Coordination Grossly Intact] : coordination grossly intact [No Focal Deficits] : no focal deficits [Normal Affect] : the affect was normal [Alert and Oriented x3] : oriented to person, place, and time [Normal Insight/Judgement] : insight and judgment were intact [Pedal Pulses Present] : the pedal pulses are present [No Extremity Clubbing/Cyanosis] : no extremity clubbing/cyanosis [FreeTextEntry1] : white plaques on tongue [de-identified] : mild b/l pedal edema

## 2018-08-24 NOTE — HISTORY OF PRESENT ILLNESS
[FreeTextEntry1] : Home visit s/p hospitalization at [facility]  [de-identified] : 74 y/o male who presented to [facility] on 8/15/18 with worsening SOB. Pt treated for COPD exacerbation (treated with IV solumedrol, azithromycin PO and prednisone PO. Pt also treated for SVT and started on metoprolol 25mg PO 2x/day. Pt discharged home on 8/21/18 and referred to [facility] at home for home care services. \par Upon visit today, pt denies chest pain, increased SOB from baseline, palpitations, N/V/D, abdominal pain, fever. Pt endorses a dry cough after using his nebulizer 3x/day.

## 2018-08-24 NOTE — PLAN
[FreeTextEntry1] : Discharge instructions and medications reviewed with patient.\par Reviewed COPD management including s/s of exacerbation and when to call CN/CCC. Teach back complete. \par Reviewed inhaler use, nebulizer machine and cleaning equipment regularly. Teach back complete. \par Patient has f/u with pulmonary on 8/28/18. Pt reports that he will call PCP tomorrow to make f/u appointment.\par CN spoke with pt's PCP Dr. Ronald Dunphy and informed him of home visit and that pt started on nystatin oral suspension for thrush. \par Yellow card provided. Encouraged pt to call with any new symptoms, questions or concerns.

## 2018-08-28 ENCOUNTER — APPOINTMENT (OUTPATIENT)
Dept: PULMONOLOGY | Facility: CLINIC | Age: 73
End: 2018-08-28
Payer: MEDICARE

## 2018-08-28 VITALS
HEART RATE: 82 BPM | BODY MASS INDEX: 19.1 KG/M2 | DIASTOLIC BLOOD PRESSURE: 80 MMHG | RESPIRATION RATE: 16 BRPM | SYSTOLIC BLOOD PRESSURE: 124 MMHG | WEIGHT: 126 LBS | OXYGEN SATURATION: 95 % | HEIGHT: 68 IN

## 2018-08-28 DIAGNOSIS — Z00.00 ENCOUNTER FOR GENERAL ADULT MEDICAL EXAMINATION W/OUT ABNORMAL FINDINGS: ICD-10-CM

## 2018-08-28 PROCEDURE — 94664 DEMO&/EVAL PT USE INHALER: CPT | Mod: 59

## 2018-08-28 PROCEDURE — 94060 EVALUATION OF WHEEZING: CPT

## 2018-08-28 PROCEDURE — 99214 OFFICE O/P EST MOD 30 MIN: CPT | Mod: 25

## 2018-08-28 PROCEDURE — 85018 HEMOGLOBIN: CPT | Mod: QW

## 2018-08-28 PROCEDURE — 94727 GAS DIL/WSHOT DETER LNG VOL: CPT

## 2018-08-28 PROCEDURE — 94729 DIFFUSING CAPACITY: CPT

## 2018-08-28 RX ORDER — PREDNISONE 10 MG/1
10 TABLET ORAL
Refills: 0 | Status: DISCONTINUED | COMMUNITY
End: 2018-08-28

## 2018-10-22 ENCOUNTER — RX RENEWAL (OUTPATIENT)
Age: 73
End: 2018-10-22

## 2018-11-05 ENCOUNTER — MEDICATION RENEWAL (OUTPATIENT)
Age: 73
End: 2018-11-05

## 2018-12-17 ENCOUNTER — RX RENEWAL (OUTPATIENT)
Age: 73
End: 2018-12-17

## 2018-12-18 ENCOUNTER — RX RENEWAL (OUTPATIENT)
Age: 73
End: 2018-12-18

## 2018-12-26 ENCOUNTER — APPOINTMENT (OUTPATIENT)
Dept: PULMONOLOGY | Facility: CLINIC | Age: 73
End: 2018-12-26
Payer: MEDICARE

## 2018-12-26 VITALS
HEART RATE: 92 BPM | SYSTOLIC BLOOD PRESSURE: 118 MMHG | WEIGHT: 125 LBS | DIASTOLIC BLOOD PRESSURE: 60 MMHG | BODY MASS INDEX: 19.01 KG/M2 | OXYGEN SATURATION: 95 %

## 2018-12-26 VITALS — BODY MASS INDEX: 18.7 KG/M2 | WEIGHT: 123 LBS

## 2018-12-26 VITALS — RESPIRATION RATE: 16 BRPM

## 2018-12-26 PROCEDURE — 94010 BREATHING CAPACITY TEST: CPT

## 2018-12-26 PROCEDURE — 99214 OFFICE O/P EST MOD 30 MIN: CPT | Mod: 25

## 2018-12-26 RX ORDER — PANTOPRAZOLE 40 MG/1
40 TABLET, DELAYED RELEASE ORAL DAILY
Refills: 0 | Status: DISCONTINUED | COMMUNITY
End: 2018-12-26

## 2018-12-26 RX ORDER — NYSTATIN 100000 [USP'U]/ML
100000 SUSPENSION ORAL 4 TIMES DAILY
Qty: 140 | Refills: 0 | Status: DISCONTINUED | COMMUNITY
Start: 2018-08-24 | End: 2018-12-26

## 2019-01-10 ENCOUNTER — RX RENEWAL (OUTPATIENT)
Age: 74
End: 2019-01-10

## 2019-01-11 RX ORDER — FLUTICASONE FUROATE, UMECLIDINIUM BROMIDE AND VILANTEROL TRIFENATATE 100; 62.5; 25 UG/1; UG/1; UG/1
100-62.5-25 POWDER RESPIRATORY (INHALATION)
Qty: 1 | Refills: 5 | Status: DISCONTINUED | COMMUNITY
Start: 2018-12-26 | End: 2019-01-11

## 2019-02-15 ENCOUNTER — RX RENEWAL (OUTPATIENT)
Age: 74
End: 2019-02-15

## 2019-03-05 ENCOUNTER — RX RENEWAL (OUTPATIENT)
Age: 74
End: 2019-03-05

## 2019-06-24 ENCOUNTER — APPOINTMENT (OUTPATIENT)
Dept: PULMONOLOGY | Facility: CLINIC | Age: 74
End: 2019-06-24
Payer: MEDICARE

## 2019-06-24 VITALS — HEIGHT: 68 IN | WEIGHT: 129 LBS | BODY MASS INDEX: 19.55 KG/M2

## 2019-06-24 VITALS — OXYGEN SATURATION: 93 % | DIASTOLIC BLOOD PRESSURE: 70 MMHG | HEART RATE: 81 BPM | SYSTOLIC BLOOD PRESSURE: 110 MMHG

## 2019-06-24 PROCEDURE — 94010 BREATHING CAPACITY TEST: CPT

## 2019-06-24 PROCEDURE — 99214 OFFICE O/P EST MOD 30 MIN: CPT | Mod: 25

## 2019-06-24 NOTE — ASSESSMENT
[FreeTextEntry1] : COPD stable on Trelegy. Continue current meds. Followup 6 months with spirometry.

## 2019-06-24 NOTE — HISTORY OF PRESENT ILLNESS
[Difficulty Breathing During Exertion] : stable dyspnea on exertion [Cough] : denies coughing [Wheezing] : denies wheezing [Feelings Of Weakness On Exertion] : stable exercise intolerance [Regional Soft Tissue Swelling Both Lower Extremities] : denies lower extremity edema [Chest Pain Or Discomfort] : denies chest pain [Fever] : denies fever [2  -  Slight] : 2, slight [Former] : is a former smoker [Adherent] : the patient is adherent with ~his/her~ medication regimen [Goals--Doing Well] : the patient is doing well with ~his/her~ goals

## 2019-06-24 NOTE — PHYSICAL EXAM
[FreeTextEntry1] : cachectic [Normal Conjunctiva] : the conjunctiva exhibited no abnormalities [Normal Oropharynx] : normal oropharynx [Eyelids - No Xanthelasma] : the eyelids demonstrated no xanthelasmas [Neck Appearance] : the appearance of the neck was normal [Jugular Venous Distention Increased] : there was no jugular-venous distention [Neck Cervical Mass (___cm)] : no neck mass was observed [Thyroid Nodule] : there were no palpable thyroid nodules [Thyroid Diffuse Enlargement] : the thyroid was not enlarged [Heart Sounds] : normal S1 and S2 [Murmurs] : no murmurs present [Heart Rate And Rhythm] : heart rate and rhythm were normal [Abdomen Tenderness] : non-tender [Abdomen Soft] : soft [Abnormal Walk] : normal gait [Abdomen Mass (___ Cm)] : no abdominal mass palpated [Gait - Sufficient For Exercise Testing] : the gait was sufficient for exercise testing [Cyanosis, Localized] : no localized cyanosis [Nail Clubbing] : no clubbing of the fingernails [Petechial Hemorrhages (___cm)] : no petechial hemorrhages [Skin Color & Pigmentation] : normal skin color and pigmentation [No Venous Stasis] : no venous stasis [] : no rash [No Skin Ulcers] : no skin ulcer [Skin Lesions] : no skin lesions [No Xanthoma] : no  xanthoma was observed [Deep Tendon Reflexes (DTR)] : deep tendon reflexes were 2+ and symmetric [Sensation] : the sensory exam was normal to light touch and pinprick [No Focal Deficits] : no focal deficits [Oriented To Time, Place, And Person] : oriented to person, place, and time [Impaired Insight] : insight and judgment were intact [Affect] : the affect was normal [Normal Rate] : the respiratory rate was normal [Normal Rhythm/Effort] : normal respiratory rhythm and effort [Decreased Breath Sounds] : breath sounds were decreased diffusely [Tympany] : tympany to percussion [Normal] : palpation of the chest was normal

## 2019-10-24 ENCOUNTER — MEDICATION RENEWAL (OUTPATIENT)
Age: 74
End: 2019-10-24

## 2019-10-25 ENCOUNTER — MEDICATION RENEWAL (OUTPATIENT)
Age: 74
End: 2019-10-25

## 2019-12-13 ENCOUNTER — APPOINTMENT (OUTPATIENT)
Dept: PULMONOLOGY | Facility: CLINIC | Age: 74
End: 2019-12-13
Payer: MEDICARE

## 2019-12-13 VITALS — SYSTOLIC BLOOD PRESSURE: 100 MMHG | HEART RATE: 90 BPM | DIASTOLIC BLOOD PRESSURE: 80 MMHG | OXYGEN SATURATION: 92 %

## 2019-12-13 VITALS — RESPIRATION RATE: 16 BRPM

## 2019-12-13 VITALS — HEIGHT: 68 IN | BODY MASS INDEX: 19.7 KG/M2 | WEIGHT: 130 LBS

## 2019-12-13 PROCEDURE — 99214 OFFICE O/P EST MOD 30 MIN: CPT | Mod: 25

## 2019-12-13 PROCEDURE — 94010 BREATHING CAPACITY TEST: CPT

## 2019-12-13 RX ORDER — UMECLIDINIUM 62.5 UG/1
62.5 AEROSOL, POWDER ORAL DAILY
Qty: 3 | Refills: 3 | Status: ACTIVE | COMMUNITY
Start: 2018-08-28 | End: 1900-01-01

## 2019-12-13 NOTE — HISTORY OF PRESENT ILLNESS
[FreeTextEntry1] : Feels well on Advair and Incruse. Dyspnea at baseline. No recent intercurrent events. Got flu vaccine this year.

## 2019-12-13 NOTE — PHYSICAL EXAM
[FreeTextEntry1] : cachectic [Normal Conjunctiva] : the conjunctiva exhibited no abnormalities [Normal Oropharynx] : normal oropharynx [Eyelids - No Xanthelasma] : the eyelids demonstrated no xanthelasmas [Neck Appearance] : the appearance of the neck was normal [Neck Cervical Mass (___cm)] : no neck mass was observed [Jugular Venous Distention Increased] : there was no jugular-venous distention [Thyroid Nodule] : there were no palpable thyroid nodules [Thyroid Diffuse Enlargement] : the thyroid was not enlarged [Heart Sounds] : normal S1 and S2 [Heart Rate And Rhythm] : heart rate and rhythm were normal [Murmurs] : no murmurs present [Abdomen Soft] : soft [Abdomen Tenderness] : non-tender [Gait - Sufficient For Exercise Testing] : the gait was sufficient for exercise testing [Abnormal Walk] : normal gait [Abdomen Mass (___ Cm)] : no abdominal mass palpated [Cyanosis, Localized] : no localized cyanosis [Petechial Hemorrhages (___cm)] : no petechial hemorrhages [Nail Clubbing] : no clubbing of the fingernails [Skin Color & Pigmentation] : normal skin color and pigmentation [] : no rash [No Venous Stasis] : no venous stasis [Skin Lesions] : no skin lesions [No Skin Ulcers] : no skin ulcer [No Xanthoma] : no  xanthoma was observed [Deep Tendon Reflexes (DTR)] : deep tendon reflexes were 2+ and symmetric [Sensation] : the sensory exam was normal to light touch and pinprick [Affect] : the affect was normal [Impaired Insight] : insight and judgment were intact [Oriented To Time, Place, And Person] : oriented to person, place, and time [No Focal Deficits] : no focal deficits [Normal Rhythm/Effort] : normal respiratory rhythm and effort [Normal Rate] : the respiratory rate was normal [Decreased Breath Sounds] : breath sounds were decreased diffusely [Tympany] : tympany to percussion [Normal] : palpation of the chest was normal

## 2019-12-16 RX ORDER — FLUTICASONE PROPIONATE AND SALMETEROL 250; 50 UG/1; UG/1
250-50 POWDER RESPIRATORY (INHALATION)
Qty: 3 | Refills: 3 | Status: DISCONTINUED | COMMUNITY
End: 2019-12-16

## 2019-12-19 ENCOUNTER — RX RENEWAL (OUTPATIENT)
Age: 74
End: 2019-12-19

## 2020-01-24 RX ORDER — IPRATROPIUM BROMIDE AND ALBUTEROL SULFATE 2.5; .5 MG/3ML; MG/3ML
0.5-2.5 (3) SOLUTION RESPIRATORY (INHALATION)
Qty: 1620 | Refills: 3 | Status: DISCONTINUED | COMMUNITY
End: 2020-01-24

## 2020-06-11 ENCOUNTER — APPOINTMENT (OUTPATIENT)
Dept: PULMONOLOGY | Facility: CLINIC | Age: 75
End: 2020-06-11

## 2020-08-17 ENCOUNTER — APPOINTMENT (OUTPATIENT)
Dept: PULMONOLOGY | Facility: CLINIC | Age: 75
End: 2020-08-17
Payer: MEDICARE

## 2020-08-17 VITALS — WEIGHT: 130 LBS | BODY MASS INDEX: 19.77 KG/M2

## 2020-08-17 VITALS
DIASTOLIC BLOOD PRESSURE: 60 MMHG | SYSTOLIC BLOOD PRESSURE: 100 MMHG | RESPIRATION RATE: 16 BRPM | OXYGEN SATURATION: 93 % | HEART RATE: 93 BPM

## 2020-08-17 DIAGNOSIS — J44.9 CHRONIC OBSTRUCTIVE PULMONARY DISEASE, UNSPECIFIED: ICD-10-CM

## 2020-08-17 PROCEDURE — 99214 OFFICE O/P EST MOD 30 MIN: CPT

## 2020-08-17 RX ORDER — BENAZEPRIL HYDROCHLORIDE AND HYDROCHLOROTHIAZIDE 20; 25 MG/1; MG/1
20-25 TABLET, FILM COATED ORAL
Qty: 30 | Refills: 0 | Status: ACTIVE | COMMUNITY
Start: 2020-02-21

## 2020-08-17 NOTE — PHYSICAL EXAM
[FreeTextEntry1] : cachectic [Normal Conjunctiva] : the conjunctiva exhibited no abnormalities [Normal Oropharynx] : normal oropharynx [Eyelids - No Xanthelasma] : the eyelids demonstrated no xanthelasmas [Neck Appearance] : the appearance of the neck was normal [Neck Cervical Mass (___cm)] : no neck mass was observed [Thyroid Nodule] : there were no palpable thyroid nodules [Thyroid Diffuse Enlargement] : the thyroid was not enlarged [Jugular Venous Distention Increased] : there was no jugular-venous distention [Heart Rate And Rhythm] : heart rate and rhythm were normal [Murmurs] : no murmurs present [Heart Sounds] : normal S1 and S2 [Abdomen Soft] : soft [Abdomen Tenderness] : non-tender [Abdomen Mass (___ Cm)] : no abdominal mass palpated [Gait - Sufficient For Exercise Testing] : the gait was sufficient for exercise testing [Nail Clubbing] : no clubbing of the fingernails [Abnormal Walk] : normal gait [Cyanosis, Localized] : no localized cyanosis [Petechial Hemorrhages (___cm)] : no petechial hemorrhages [Skin Color & Pigmentation] : normal skin color and pigmentation [No Venous Stasis] : no venous stasis [] : no rash [Skin Lesions] : no skin lesions [No Skin Ulcers] : no skin ulcer [No Xanthoma] : no  xanthoma was observed [No Focal Deficits] : no focal deficits [Sensation] : the sensory exam was normal to light touch and pinprick [Deep Tendon Reflexes (DTR)] : deep tendon reflexes were 2+ and symmetric [Oriented To Time, Place, And Person] : oriented to person, place, and time [Affect] : the affect was normal [Normal Rate] : the respiratory rate was normal [Impaired Insight] : insight and judgment were intact [Tympany] : tympany to percussion [Normal Rhythm/Effort] : normal respiratory rhythm and effort [Decreased Breath Sounds] : breath sounds were decreased diffusely [Normal] : palpation of the chest was normal

## 2021-02-05 ENCOUNTER — APPOINTMENT (OUTPATIENT)
Dept: PULMONOLOGY | Facility: CLINIC | Age: 76
End: 2021-02-05

## 2021-03-22 ENCOUNTER — RX RENEWAL (OUTPATIENT)
Age: 76
End: 2021-03-22

## 2021-03-22 RX ORDER — FLUTICASONE PROPIONATE AND SALMETEROL 50; 250 UG/1; UG/1
250-50 POWDER RESPIRATORY (INHALATION)
Qty: 3 | Refills: 1 | Status: ACTIVE | COMMUNITY
Start: 2019-12-16 | End: 1900-01-01

## 2021-05-26 ENCOUNTER — RX RENEWAL (OUTPATIENT)
Age: 76
End: 2021-05-26

## 2021-05-26 RX ORDER — IPRATROPIUM BROMIDE AND ALBUTEROL SULFATE 2.5; .5 MG/3ML; MG/3ML
0.5-2.5 (3) SOLUTION RESPIRATORY (INHALATION)
Qty: 1080 | Refills: 1 | Status: ACTIVE | COMMUNITY
Start: 2020-01-24 | End: 1900-01-01

## 2021-08-08 NOTE — PHYSICAL THERAPY INITIAL EVALUATION ADULT - GENERAL OBSERVATIONS, REHAB EVAL
Pt's meal tray arrived and eating breakfast. Emotional support provided. Denies any pain at this time. Call light within reach and bed in lowest position. Pt received sitting in bed in the ED +respiratory treatment (+2.5 L O2 when treatment completed as per RN), + IV left hand, +cardiac monitor +pulse oximeter, NAD. Agreeable to PT evaluation

## 2021-12-30 NOTE — PHYSICAL THERAPY INITIAL EVALUATION ADULT - STANDING BALANCE: STATIC
Erlinda Hansen called to request a refill on her medication. Last office visit : 11/24/2021   Next office visit : 2/25/2022     Last UDS:   Amphetamine Screen, Urine   Date Value Ref Range Status   11/24/2021 neg  Final     Barbiturate Screen, Urine   Date Value Ref Range Status   11/24/2021 pos  Final     Benzodiazepine Screen, Urine   Date Value Ref Range Status   11/24/2021 neg  Final     Buprenorphine Urine   Date Value Ref Range Status   11/24/2021 neg  Final     Cocaine Metabolite Screen, Urine   Date Value Ref Range Status   11/24/2021 neg  Final     Gabapentin Screen, Urine   Date Value Ref Range Status   11/24/2021 neg  Final     MDMA, Urine   Date Value Ref Range Status   11/24/2021 neg  Final     Methamphetamine, Urine   Date Value Ref Range Status   11/24/2021 neg  Final     Opiate Scrn, Ur   Date Value Ref Range Status   11/24/2021 neg  Final     Oxycodone Screen, Ur   Date Value Ref Range Status   11/24/2021 neg  Final     PCP Screen, Urine   Date Value Ref Range Status   11/24/2021 neg  Final     Propoxyphene Screen, Urine   Date Value Ref Range Status   11/24/2021 neg  Final     THC Screen, Urine   Date Value Ref Range Status   11/24/2021 neg  Final     Tricyclic Antidepressants, Urine   Date Value Ref Range Status   11/24/2021 neg  Final       Last Corky Andrew: 12/30/21  Medication Contract: 11/24/21   Last Fill: n/a    Requested Prescriptions     Pending Prescriptions Disp Refills    butalbital-acetaminophen-caffeine (FIORICET, ESGIC) -40 MG per tablet 90 tablet 1         Please approve or refuse this medication.    Colleen Mcclelland MA good balance

## 2022-09-13 ENCOUNTER — EMERGENCY (EMERGENCY)
Facility: HOSPITAL | Age: 77
LOS: 1 days | Discharge: HOME | End: 2022-09-13
Attending: EMERGENCY MEDICINE
Payer: MEDICARE

## 2022-09-13 VITALS
RESPIRATION RATE: 22 BRPM | TEMPERATURE: 98 F | HEART RATE: 100 BPM | SYSTOLIC BLOOD PRESSURE: 161 MMHG | OXYGEN SATURATION: 99 % | DIASTOLIC BLOOD PRESSURE: 96 MMHG | HEIGHT: 70 IN

## 2022-09-13 DIAGNOSIS — Z98.890 OTHER SPECIFIED POSTPROCEDURAL STATES: Chronic | ICD-10-CM

## 2022-09-13 LAB
BASOPHILS # BLD AUTO: 0.03 K/UL — SIGNIFICANT CHANGE UP (ref 0–0.2)
BASOPHILS NFR BLD AUTO: 0.4 % — SIGNIFICANT CHANGE UP (ref 0–2)
EOSINOPHIL # BLD AUTO: 0.26 K/UL — SIGNIFICANT CHANGE UP (ref 0–0.5)
EOSINOPHIL NFR BLD AUTO: 3.9 % — SIGNIFICANT CHANGE UP (ref 0–6)
HCT VFR BLD CALC: 43 % — SIGNIFICANT CHANGE UP (ref 39–50)
HGB BLD-MCNC: 14.3 G/DL — SIGNIFICANT CHANGE UP (ref 13–17)
IMM GRANULOCYTES NFR BLD AUTO: 0.3 % — SIGNIFICANT CHANGE UP (ref 0–1.5)
LYMPHOCYTES # BLD AUTO: 1.18 K/UL — SIGNIFICANT CHANGE UP (ref 1–3.3)
LYMPHOCYTES # BLD AUTO: 17.5 % — SIGNIFICANT CHANGE UP (ref 13–44)
MCHC RBC-ENTMCNC: 32.1 PG — SIGNIFICANT CHANGE UP (ref 27–34)
MCHC RBC-ENTMCNC: 33.3 GM/DL — SIGNIFICANT CHANGE UP (ref 32–36)
MCV RBC AUTO: 96.4 FL — SIGNIFICANT CHANGE UP (ref 80–100)
MONOCYTES # BLD AUTO: 0.59 K/UL — SIGNIFICANT CHANGE UP (ref 0–0.9)
MONOCYTES NFR BLD AUTO: 8.7 % — SIGNIFICANT CHANGE UP (ref 2–14)
NEUTROPHILS # BLD AUTO: 4.67 K/UL — SIGNIFICANT CHANGE UP (ref 1.8–7.4)
NEUTROPHILS NFR BLD AUTO: 69.2 % — SIGNIFICANT CHANGE UP (ref 43–77)
PLATELET # BLD AUTO: 173 K/UL — SIGNIFICANT CHANGE UP (ref 150–400)
RBC # BLD: 4.46 M/UL — SIGNIFICANT CHANGE UP (ref 4.2–5.8)
RBC # FLD: 13.3 % — SIGNIFICANT CHANGE UP (ref 10.3–14.5)
WBC # BLD: 6.75 K/UL — SIGNIFICANT CHANGE UP (ref 3.8–10.5)
WBC # FLD AUTO: 6.75 K/UL — SIGNIFICANT CHANGE UP (ref 3.8–10.5)

## 2022-09-13 PROCEDURE — 99284 EMERGENCY DEPT VISIT MOD MDM: CPT

## 2022-09-13 PROCEDURE — 71045 X-RAY EXAM CHEST 1 VIEW: CPT | Mod: 26

## 2022-09-13 PROCEDURE — 93010 ELECTROCARDIOGRAM REPORT: CPT

## 2022-09-13 RX ORDER — IPRATROPIUM/ALBUTEROL SULFATE 18-103MCG
3 AEROSOL WITH ADAPTER (GRAM) INHALATION ONCE
Refills: 0 | Status: COMPLETED | OUTPATIENT
Start: 2022-09-13 | End: 2022-09-13

## 2022-09-13 RX ORDER — MAGNESIUM SULFATE 500 MG/ML
2 VIAL (ML) INJECTION ONCE
Refills: 0 | Status: COMPLETED | OUTPATIENT
Start: 2022-09-13 | End: 2022-09-13

## 2022-09-13 RX ADMIN — Medication 3 MILLILITER(S): at 23:40

## 2022-09-13 RX ADMIN — Medication 50 MILLIGRAM(S): at 22:45

## 2022-09-13 RX ADMIN — Medication 3 MILLILITER(S): at 22:47

## 2022-09-13 NOTE — ED PROVIDER NOTE - PHYSICAL EXAMINATION
Constitutional: Uncomfortable appearing, awake, alert, oriented to person, place, and time/situation and in no apparent distress  ENMT: Airway patent nasal mucosa clear. Mouth with normal mucosa. Throat has no vesicles no oropharyngeal exudates and uvula is midline. No blood in the oropharynx  EYES: clear bilaterally, pupils equal, round and reactive to light  Cardiac: Regular rate, regular rhythm. Heart sounds S1, S2. No murmurs, rubs or gallops. Good capillary refill, 2+ pulses, no peripheral edema  Respiratory: Bilateral wheezing in lower lung fields, no use of accessory muscles, no crackles, satting 87% on RA, 95% on 2L NC in no distress  Gastrointestinal: Abdomen nondistended, non-tender, no rebound guarding or peritoneal signs  Genitourinary: No CVA tenderness, pelvis nontender, bladder nondistended  Musculoskeletal: Spine appears normal, range of motion is not limited, no muscle or joint tenderness  Neurological: Alert and oriented, no focal deficits, no motor or sensory deficits. CN 2-12 intact, PERRLA, EOMI, No FND, moving all extremities equally, sensation intact, 5/5 strength   Skin: Skin normal color for race, warm, dry and intact, No evidence of rash

## 2022-09-13 NOTE — ED PROVIDER NOTE - PROGRESS NOTE DETAILS
JK - vss, resting comfortably in no apparent distress. Labs, CXR WNL. Patient reports improvement in his symptoms status post duonebs and steroids. Steroids sent to pharmacy. Patient ready and agreeable to DC with return precautions.

## 2022-09-13 NOTE — ED PROVIDER NOTE - ATTENDING CONTRIBUTION TO CARE
Cande: I performed a face to face bedside interview with patient regarding history of present illness, review of symptoms and past medical history. I completed an independent physical exam.  I have discussed patient's plan of care with resident.   I agree with note as stated above including HISTORY OF PRESENT ILLNESS, HIV, PAST MEDICAL/SURGICAL/FAMILY/SOCIAL HISTORY, ALLERGIES AND HOME MEDICATIONS, REVIEW OF SYSTEMS, PHYSICAL EXAM, MEDICAL DECISION MAKING and any PROGRESS NOTES during the time I functioned as the attending physician for this patient unless otherwise noted. My brief assessment is as follows: Patient is a 78 yo male with PMHx COPD not on home O2, HTN presenting with SOB since this afternoon as well as upper abdominal pain and nausea which has now resolved. As per patient and daughter at bedside patient has had SOB at rest since this afternoon without improvement from his albuterol inhaler; patient initially had nausea and abdominal pain which is now resolved. Denies any cardiac history, active smoking, fever, cough, congestion. Patient satting 88% on RA, improving to 95% on 2L NC, bilateral wheezes appreciated. Will r/o PNA with cxr, give nebs steroids and magnesium for COPD exacerbation, check labs, reassess.

## 2022-09-13 NOTE — ED PROVIDER NOTE - PATIENT PORTAL LINK FT
You can access the FollowMyHealth Patient Portal offered by [facility] by registering at the following website: [email or web address]. By joining [employer]’s FollowMyHealth portal, you will also be able to view your health information using other applications (apps) compatible with our system.

## 2022-09-13 NOTE — ED PROVIDER NOTE - CLINICAL SUMMARY MEDICAL DECISION MAKING FREE TEXT BOX
Patient is a 76 yo male with PMHx COPD not on home O2, HTN presenting with SOB since this afternoon as well as upper abdominal pain and nausea which has now resolved. As per patient and daughter at bedside patient has had SOB at rest since this afternoon without improvement from his albuterol inhaler; patient initially had nausea and abdominal pain which is now resolved. Denies any cardiac history, active smoking, fever, cough, congestion. Patient satting 88% on RA, improving to 95% on 2L NC, bilateral wheezes appreciated. Will r/o PNA with cxr, give nebs steroids and magnesium, check labs, reassess. Patient is a 78 yo male with PMHx COPD not on home O2, HTN presenting with SOB since this afternoon as well as upper abdominal pain and nausea which has now resolved. As per patient and daughter at bedside patient has had SOB at rest since this afternoon without improvement from his albuterol inhaler; patient initially had nausea and abdominal pain which is now resolved. Denies any cardiac history, active smoking, fever, cough, congestion. Patient satting 88% on RA, improving to 95% on 2L NC, bilateral wheezes appreciated. Will r/o PNA with cxr, give nebs steroids and magnesium for COPD exacerbation, check labs, reassess.

## 2022-09-13 NOTE — ED PROVIDER NOTE - NSFOLLOWUPINSTRUCTIONS_ED_ALL_ED_FT
COPD (Chronic Obstructive Pulmonary Disease)    WHAT YOU NEED TO KNOW:    COPD (chronic obstructive pulmonary disease) can get worse quickly. Your healthcare providers will help you create a care plan to use at home. The plan will give directions on how to prevent or manage shortness of breath. Your family members or anyone who cares for you will also get directions to help you.  [employer]         DISCHARGE INSTRUCTIONS:    Call your local emergency number (911 in the [place]) if:   •You feel lightheaded, short of breath, and have chest pain.          Seek care immediately if:   •You cough up blood.      •You are confused, dizzy, or feel faint.      •Your arm or leg feels warm, tender, and painful. It may look swollen and red.      Call your doctor if:   •You have increased shortness of breath.      •You need more medicine than usual to control your symptoms.      •You are coughing or wheezing more than usual.      •You are coughing up more mucus, or it has a new color or odor.      •You gain more than 3 pounds in a week.      •You have a fever, a runny or stuffy nose, and a sore throat, or other cold or flu symptoms.      •Your skin, lips, or nails start to turn blue.      •You have swelling in your legs or ankles.      •You are very tired or weak for more than a day.      •You notice changes in your mood, or changes in your ability to think or concentrate.      •You have questions or concerns about your condition or care.      Medicines:   •Short-acting bronchodilators may be called rescue inhalers or relievers. They relieve sudden, severe symptoms and start to work right away.      •Long-acting bronchodilators may be called controllers or maintenance medicine. This medicine helps open the airway over time, and is used to decrease and prevent breathing problems. Long-acting bronchodilators should not be used to treat sudden, severe symptoms, such as trouble breathing.      •Antibioticsmay be given for up to 5 days to treat a bacterial infection during an exacerbation.      •Take your medicine as directed. Contact your healthcare provider if you think your medicine is not helping or if you have side effects. Tell your provider if you are allergic to any medicine. Keep a list of the medicines, vitamins, and herbs you take. Include the amounts, and when and why you take them. Bring the list or the pill bottles to follow-up visits. Carry your medicine list with you in case of an emergency.      Help make breathing easier:   •Use pursed-lip breathing any time you feel short of breath. Take a deep breath in through your nose. Slowly breathe out through your mouth with your lips pursed. Try to take 2 times as long to breathe out as to breathe in. This helps you get rid of as much air from your lungs as possible. You can also practice this breathing pattern while you bend, lift, climb stairs, or exercise. It slows down your breathing and helps move more air in and out of your lungs.  Breathe in Breathe out           •Avoid anything that makes your symptoms worse. Stay out of high altitudes and places with high humidity. Stay inside, or cover your mouth and nose with a scarf when you are outside in cold weather. Stay inside on days when air pollution or pollen counts are high. Do not use aerosol sprays such as deodorant, bug spray, and hairspray.      •Exercise as directed. Your healthcare provider may recommend at least 20 minutes of exercise each day to help increase your energy and decrease shortness of breath. Talk to your provider about the best exercise plan for you.   Family Walking for Exercise           Manage COPD and help prevent exacerbations: COPD is a serious condition that gets worse over time. A COPD exacerbation means your symptoms suddenly get worse. It is important to prevent exacerbations. An exacerbation can cause more lung damage. COPD cannot be cured, but you can take action to feel better and prevent exacerbations:   •Do not smoke. Nicotine and other chemicals in cigarettes and cigars can cause lung damage and make your COPD worse. Ask your healthcare provider for information if you currently smoke and need help to quit. E-cigarettes or smokeless tobacco still contain nicotine. Talk to your healthcare provider before you use these products.      •Avoid secondhand smoke. This is smoke another person exhales. Even if you have never smoked or have quit, it is important to avoid secondhand smoke. This smoke can also cause lung damage or trigger an exacerbation.      •Go to pulmonary rehabilitation (rehab) if directed. Rehab is a program run by specialists who help you learn to manage COPD. Examples include a pulmonologist (lung specialist), dietitian, or exercise therapist. The specialists will help you make a plan to avoid triggers that cause an exacerbation.      •Take your medicines as directed. Refill your medicines before you are out so that you do not miss a dose. Ask your healthcare provider if you have any questions on how to take your medicines.      •Protect yourself from germs. Germs can get into your lungs and cause an infection. An infection in your lungs can create more mucus and make it harder to breathe. An infection can also create swelling in your airway and prevent air from getting in. You can decrease your risk for infection by doing the following:        ?Wash your hands often with soap and water. Carry germ-killing gel with you. You can use the gel to clean your hands when soap and water are not available.  Handwashing           ?Do not touch your eyes, nose, or mouth unless you have washed your hands first.      ?Always cover your mouth when you cough. Cough into a tissue or your shirtsleeve so you do not spread germs from your hands.      ?Try to avoid people who have a cold or the flu. If you are sick, stay away from others as much as possible.      ?Ask about vaccines you may need. Influenza (the flu), pneumonia, and COVID-19 can become life-threatening for a person who has COPD. Get a yearly flu vaccine as soon as recommended, usually in September or October. The pneumonia vaccine may be given every 5 years, or as directed. COVID-19 vaccines are available in shots given in 1 or 2 doses. Your healthcare provider can tell you if you should also get other vaccines, and when to get them.      •Drink liquids as directed. You may need to drink more liquid than usual. Liquid will help to keep your air passages moist and help you cough up mucus. Ask how much liquid to drink each day and which liquids are best for you.      Follow up with your doctor as directed: You may need more tests. Your doctor may refer you to a specialist, depending on your needs. Some specialist services may be available through your pulmonary rehab program. Write down your questions so you remember to ask them during your visits.

## 2022-09-13 NOTE — ED PROVIDER NOTE - NSICDXPASTMEDICALHX_GEN_ALL_CORE_FT
PAST MEDICAL HISTORY:  Chronic obstructive pulmonary disease, unspecified COPD type     Essential hypertension

## 2022-09-13 NOTE — ED PROVIDER NOTE - OBJECTIVE STATEMENT
Patient is a 76 yo male with PMHx COPD not on home O2, HTN presenting with SOB since this afternoon as well as upper abdominal pain and nausea which has now resolved. As per patient and daughter at bedside patient has had SOB at rest since this afternoon without improvement from his albuterol inhaler; patient initially had nausea and abdominal pain which is now resolved. Denies any cardiac history, active smoking, fever, cough, congestion. Patient satting 88% on RA, improving to 95% on 2L NC. Denies fevers, chills, dizziness, lightheadedness, dysphagia, dysarthria, diplopia, photophobia, syncope, cough, congestion, CP, neck pain, back pain, diarrhea, dysuria, hematuria, hematochezia, hematemesis, recent travel, sick contacts, leg swelling.

## 2022-09-14 VITALS
HEART RATE: 95 BPM | RESPIRATION RATE: 20 BRPM | SYSTOLIC BLOOD PRESSURE: 138 MMHG | TEMPERATURE: 98 F | DIASTOLIC BLOOD PRESSURE: 79 MMHG | OXYGEN SATURATION: 94 %

## 2022-09-14 LAB
ALBUMIN SERPL ELPH-MCNC: 4.3 G/DL — SIGNIFICANT CHANGE UP (ref 3.3–5.2)
ALP SERPL-CCNC: 91 U/L — SIGNIFICANT CHANGE UP (ref 40–120)
ALT FLD-CCNC: 22 U/L — SIGNIFICANT CHANGE UP
ANION GAP SERPL CALC-SCNC: 10 MMOL/L — SIGNIFICANT CHANGE UP (ref 5–17)
AST SERPL-CCNC: 31 U/L — SIGNIFICANT CHANGE UP
BILIRUB SERPL-MCNC: 0.4 MG/DL — SIGNIFICANT CHANGE UP (ref 0.4–2)
BUN SERPL-MCNC: 14.6 MG/DL — SIGNIFICANT CHANGE UP (ref 8–20)
CALCIUM SERPL-MCNC: 9.6 MG/DL — SIGNIFICANT CHANGE UP (ref 8.4–10.5)
CHLORIDE SERPL-SCNC: 99 MMOL/L — SIGNIFICANT CHANGE UP (ref 98–107)
CO2 SERPL-SCNC: 34 MMOL/L — HIGH (ref 22–29)
CREAT SERPL-MCNC: 0.99 MG/DL — SIGNIFICANT CHANGE UP (ref 0.5–1.3)
EGFR: 78 ML/MIN/1.73M2 — SIGNIFICANT CHANGE UP
GLUCOSE SERPL-MCNC: 125 MG/DL — HIGH (ref 70–99)
LIDOCAIN IGE QN: 28 U/L — SIGNIFICANT CHANGE UP (ref 22–51)
NT-PROBNP SERPL-SCNC: 55 PG/ML — SIGNIFICANT CHANGE UP (ref 0–300)
POTASSIUM SERPL-MCNC: 4.1 MMOL/L — SIGNIFICANT CHANGE UP (ref 3.5–5.3)
POTASSIUM SERPL-SCNC: 4.1 MMOL/L — SIGNIFICANT CHANGE UP (ref 3.5–5.3)
PROT SERPL-MCNC: 7.1 G/DL — SIGNIFICANT CHANGE UP (ref 6.6–8.7)
RAPID RVP RESULT: SIGNIFICANT CHANGE UP
SARS-COV-2 RNA SPEC QL NAA+PROBE: SIGNIFICANT CHANGE UP
SODIUM SERPL-SCNC: 143 MMOL/L — SIGNIFICANT CHANGE UP (ref 135–145)

## 2022-09-14 PROCEDURE — 96374 THER/PROPH/DIAG INJ IV PUSH: CPT

## 2022-09-14 PROCEDURE — 83690 ASSAY OF LIPASE: CPT

## 2022-09-14 PROCEDURE — 0225U NFCT DS DNA&RNA 21 SARSCOV2: CPT

## 2022-09-14 PROCEDURE — 71045 X-RAY EXAM CHEST 1 VIEW: CPT

## 2022-09-14 PROCEDURE — 80053 COMPREHEN METABOLIC PANEL: CPT

## 2022-09-14 PROCEDURE — 83880 ASSAY OF NATRIURETIC PEPTIDE: CPT

## 2022-09-14 PROCEDURE — 93005 ELECTROCARDIOGRAM TRACING: CPT

## 2022-09-14 PROCEDURE — 36415 COLL VENOUS BLD VENIPUNCTURE: CPT

## 2022-09-14 PROCEDURE — 85025 COMPLETE CBC W/AUTO DIFF WBC: CPT

## 2022-09-14 PROCEDURE — 99285 EMERGENCY DEPT VISIT HI MDM: CPT | Mod: 25

## 2022-09-14 PROCEDURE — 94640 AIRWAY INHALATION TREATMENT: CPT

## 2022-09-14 RX ADMIN — Medication 150 GRAM(S): at 00:04

## 2022-09-14 NOTE — ED ADULT NURSE REASSESSMENT NOTE - NS ED NURSE REASSESS COMMENT FT1
pt with complaints of SOB and chest tightness at 2315. MD ESTHELA Ardon aware. pt placed on tele/. O2 placed on pt, pt states relief with O2. medications administered.

## 2022-09-14 NOTE — ED ADULT NURSE NOTE - OBJECTIVE STATEMENT
pt to ED with complaints of SOB x 2 days. pt also complains of intermittent abdominal pain accompanied with Nausea. pt states he was taking advair and his albuterol nebulizer at home without improvement. pt denies any chills/fever/recent sick contacts/dizziness.

## 2022-09-20 ENCOUNTER — APPOINTMENT (OUTPATIENT)
Dept: PULMONOLOGY | Facility: CLINIC | Age: 77
End: 2022-09-20

## 2022-10-10 ENCOUNTER — APPOINTMENT (OUTPATIENT)
Dept: PULMONOLOGY | Facility: CLINIC | Age: 77
End: 2022-10-10

## 2022-10-10 NOTE — ED ADULT NURSE NOTE - CAS EDN DISCHARGE INTERVENTIONS
IV discontinued, cath removed intact Olumiant Pregnancy And Lactation Text: Based on animal studies, Olumiant may cause embryo-fetal harm when administered to pregnant women.  The medication should not be used in pregnancy.  Breastfeeding is not recommended during treatment.
